# Patient Record
Sex: FEMALE | Race: WHITE | NOT HISPANIC OR LATINO | Employment: OTHER | URBAN - METROPOLITAN AREA
[De-identification: names, ages, dates, MRNs, and addresses within clinical notes are randomized per-mention and may not be internally consistent; named-entity substitution may affect disease eponyms.]

---

## 2018-02-03 ENCOUNTER — APPOINTMENT (OUTPATIENT)
Dept: GENERAL RADIOLOGY | Facility: CLINIC | Age: 68
End: 2018-02-03
Attending: ORTHOPAEDIC SURGERY
Payer: MEDICARE

## 2018-02-03 ENCOUNTER — ANESTHESIA EVENT (OUTPATIENT)
Dept: SURGERY | Facility: CLINIC | Age: 68
End: 2018-02-03
Payer: MEDICARE

## 2018-02-03 ENCOUNTER — ANESTHESIA (OUTPATIENT)
Dept: SURGERY | Facility: CLINIC | Age: 68
End: 2018-02-03
Payer: MEDICARE

## 2018-02-03 ENCOUNTER — APPOINTMENT (OUTPATIENT)
Dept: GENERAL RADIOLOGY | Facility: CLINIC | Age: 68
End: 2018-02-03
Attending: INTERNAL MEDICINE
Payer: MEDICARE

## 2018-02-03 ENCOUNTER — HOSPITAL ENCOUNTER (OUTPATIENT)
Facility: CLINIC | Age: 68
Setting detail: OBSERVATION
Discharge: HOME | End: 2018-02-04
Attending: INTERNAL MEDICINE | Admitting: ORTHOPAEDIC SURGERY
Payer: MEDICARE

## 2018-02-03 DIAGNOSIS — Z78.9 DEEP VEIN THROMBOSIS (DVT) PROPHYLAXIS PRESCRIBED AT DISCHARGE: Primary | ICD-10-CM

## 2018-02-03 DIAGNOSIS — S82.851A CLOSED TRIMALLEOLAR FRACTURE OF RIGHT ANKLE, INITIAL ENCOUNTER: ICD-10-CM

## 2018-02-03 PROBLEM — S82.891A CLOSED RIGHT ANKLE FRACTURE: Status: ACTIVE | Noted: 2018-02-03

## 2018-02-03 LAB
ANION GAP SERPL CALCULATED.3IONS-SCNC: 8 MMOL/L (ref 3–14)
BASOPHILS # BLD AUTO: 0.1 10E9/L (ref 0–0.2)
BASOPHILS NFR BLD AUTO: 0.6 %
BUN SERPL-MCNC: 18 MG/DL (ref 7–30)
CALCIUM SERPL-MCNC: 9.4 MG/DL (ref 8.5–10.1)
CHLORIDE SERPL-SCNC: 105 MMOL/L (ref 94–109)
CO2 SERPL-SCNC: 24 MMOL/L (ref 20–32)
CREAT SERPL-MCNC: 0.96 MG/DL (ref 0.52–1.04)
DIFFERENTIAL METHOD BLD: ABNORMAL
EOSINOPHIL # BLD AUTO: 0.2 10E9/L (ref 0–0.7)
EOSINOPHIL NFR BLD AUTO: 1.7 %
ERYTHROCYTE [DISTWIDTH] IN BLOOD BY AUTOMATED COUNT: 12.9 % (ref 10–15)
GFR SERPL CREATININE-BSD FRML MDRD: 58 ML/MIN/1.7M2
GLUCOSE SERPL-MCNC: 97 MG/DL (ref 70–99)
HCT VFR BLD AUTO: 40 % (ref 35–47)
HGB BLD-MCNC: 12.9 G/DL (ref 11.7–15.7)
IMM GRANULOCYTES # BLD: 0.1 10E9/L (ref 0–0.4)
IMM GRANULOCYTES NFR BLD: 0.4 %
LYMPHOCYTES # BLD AUTO: 2.3 10E9/L (ref 0.8–5.3)
LYMPHOCYTES NFR BLD AUTO: 17.1 %
MCH RBC QN AUTO: 30.7 PG (ref 26.5–33)
MCHC RBC AUTO-ENTMCNC: 32.3 G/DL (ref 31.5–36.5)
MCV RBC AUTO: 95 FL (ref 78–100)
MONOCYTES # BLD AUTO: 0.8 10E9/L (ref 0–1.3)
MONOCYTES NFR BLD AUTO: 5.7 %
NEUTROPHILS # BLD AUTO: 9.8 10E9/L (ref 1.6–8.3)
NEUTROPHILS NFR BLD AUTO: 74.5 %
NRBC # BLD AUTO: 0 10*3/UL
NRBC BLD AUTO-RTO: 0 /100
PLATELET # BLD AUTO: 362 10E9/L (ref 150–450)
POTASSIUM SERPL-SCNC: 4.4 MMOL/L (ref 3.4–5.3)
RBC # BLD AUTO: 4.2 10E12/L (ref 3.8–5.2)
SODIUM SERPL-SCNC: 137 MMOL/L (ref 133–144)
WBC # BLD AUTO: 13.2 10E9/L (ref 4–11)

## 2018-02-03 PROCEDURE — 25000128 H RX IP 250 OP 636: Performed by: NURSE ANESTHETIST, CERTIFIED REGISTERED

## 2018-02-03 PROCEDURE — 73600 X-RAY EXAM OF ANKLE: CPT | Mod: LT

## 2018-02-03 PROCEDURE — 71000012 ZZH RECOVERY PHASE 1 LEVEL 1 FIRST HR: Performed by: ORTHOPAEDIC SURGERY

## 2018-02-03 PROCEDURE — 80048 BASIC METABOLIC PNL TOTAL CA: CPT | Performed by: INTERNAL MEDICINE

## 2018-02-03 PROCEDURE — 25000125 ZZHC RX 250: Performed by: NURSE ANESTHETIST, CERTIFIED REGISTERED

## 2018-02-03 PROCEDURE — 96374 THER/PROPH/DIAG INJ IV PUSH: CPT

## 2018-02-03 PROCEDURE — 99152 MOD SED SAME PHYS/QHP 5/>YRS: CPT

## 2018-02-03 PROCEDURE — 27810 TREATMENT OF ANKLE FRACTURE: CPT | Mod: RT

## 2018-02-03 PROCEDURE — C1769 GUIDE WIRE: HCPCS | Performed by: ORTHOPAEDIC SURGERY

## 2018-02-03 PROCEDURE — 25000128 H RX IP 250 OP 636: Performed by: ANESTHESIOLOGY

## 2018-02-03 PROCEDURE — 40000986 XR ANKLE RT 2 VW: Mod: RT

## 2018-02-03 PROCEDURE — 40000275 ZZH STATISTIC RCP TIME EA 10 MIN

## 2018-02-03 PROCEDURE — 36415 COLL VENOUS BLD VENIPUNCTURE: CPT | Performed by: ORTHOPAEDIC SURGERY

## 2018-02-03 PROCEDURE — 99223 1ST HOSP IP/OBS HIGH 75: CPT | Mod: AI | Performed by: INTERNAL MEDICINE

## 2018-02-03 PROCEDURE — 37000009 ZZH ANESTHESIA TECHNICAL FEE, EACH ADDTL 15 MIN: Performed by: ORTHOPAEDIC SURGERY

## 2018-02-03 PROCEDURE — 85025 COMPLETE CBC W/AUTO DIFF WBC: CPT | Performed by: INTERNAL MEDICINE

## 2018-02-03 PROCEDURE — 27210794 ZZH OR GENERAL SUPPLY STERILE: Performed by: ORTHOPAEDIC SURGERY

## 2018-02-03 PROCEDURE — 25000128 H RX IP 250 OP 636: Performed by: ORTHOPAEDIC SURGERY

## 2018-02-03 PROCEDURE — 12000000 ZZH R&B MED SURG/OB

## 2018-02-03 PROCEDURE — 27210995 ZZH RX 272: Performed by: ORTHOPAEDIC SURGERY

## 2018-02-03 PROCEDURE — 36000063 ZZH SURGERY LEVEL 4 EA 15 ADDTL MIN: Performed by: ORTHOPAEDIC SURGERY

## 2018-02-03 PROCEDURE — 36000065 ZZH SURGERY LEVEL 4 W FLUORO 1ST 30 MIN: Performed by: ORTHOPAEDIC SURGERY

## 2018-02-03 PROCEDURE — 40000277 XR SURGERY CARM FLUORO LESS THAN 5 MIN W STILLS: Mod: TC

## 2018-02-03 PROCEDURE — 25000566 ZZH SEVOFLURANE, EA 15 MIN: Performed by: ORTHOPAEDIC SURGERY

## 2018-02-03 PROCEDURE — 73610 X-RAY EXAM OF ANKLE: CPT | Mod: RT

## 2018-02-03 PROCEDURE — 40000306 ZZH STATISTIC PRE PROC ASSESS II: Performed by: ORTHOPAEDIC SURGERY

## 2018-02-03 PROCEDURE — 25000128 H RX IP 250 OP 636: Performed by: INTERNAL MEDICINE

## 2018-02-03 PROCEDURE — C1713 ANCHOR/SCREW BN/BN,TIS/BN: HCPCS | Performed by: ORTHOPAEDIC SURGERY

## 2018-02-03 PROCEDURE — 99285 EMERGENCY DEPT VISIT HI MDM: CPT | Mod: 25

## 2018-02-03 PROCEDURE — 37000008 ZZH ANESTHESIA TECHNICAL FEE, 1ST 30 MIN: Performed by: ORTHOPAEDIC SURGERY

## 2018-02-03 DEVICE — IMP SCR SYN CORTEX 2.7X14MM SELF TAP SS 202.814: Type: IMPLANTABLE DEVICE | Site: ANKLE | Status: FUNCTIONAL

## 2018-02-03 DEVICE — IMP SCR SYN CAN 4.0X46MM LONG THRD SS 207.746: Type: IMPLANTABLE DEVICE | Site: ANKLE | Status: FUNCTIONAL

## 2018-02-03 DEVICE — IMP SCR SYN CORTEX 3.5X14MM SELF TAP SS 204.814: Type: IMPLANTABLE DEVICE | Site: ANKLE | Status: FUNCTIONAL

## 2018-02-03 DEVICE — IMP PLATE SYN 1/3 TUBULAR 81MM 07H SS 241.371: Type: IMPLANTABLE DEVICE | Site: ANKLE | Status: FUNCTIONAL

## 2018-02-03 DEVICE — IMP SCR SYN CAN 4.0X44MM LONG THRD SS 207.744: Type: IMPLANTABLE DEVICE | Site: ANKLE | Status: FUNCTIONAL

## 2018-02-03 DEVICE — IMP SCR SYN CORTEX 3.5X16MM SELF TAP SS 204.816: Type: IMPLANTABLE DEVICE | Site: ANKLE | Status: FUNCTIONAL

## 2018-02-03 RX ORDER — ACETAMINOPHEN 325 MG/1
650 TABLET ORAL EVERY 4 HOURS PRN
Status: DISCONTINUED | OUTPATIENT
Start: 2018-02-06 | End: 2018-02-04 | Stop reason: HOSPADM

## 2018-02-03 RX ORDER — HYDROMORPHONE HYDROCHLORIDE 1 MG/ML
.3-.5 INJECTION, SOLUTION INTRAMUSCULAR; INTRAVENOUS; SUBCUTANEOUS
Status: DISCONTINUED | OUTPATIENT
Start: 2018-02-03 | End: 2018-02-04 | Stop reason: HOSPADM

## 2018-02-03 RX ORDER — ONDANSETRON 4 MG/1
4 TABLET, ORALLY DISINTEGRATING ORAL EVERY 6 HOURS PRN
Status: DISCONTINUED | OUTPATIENT
Start: 2018-02-03 | End: 2018-02-04 | Stop reason: HOSPADM

## 2018-02-03 RX ORDER — MAGNESIUM HYDROXIDE 1200 MG/15ML
LIQUID ORAL PRN
Status: DISCONTINUED | OUTPATIENT
Start: 2018-02-03 | End: 2018-02-03 | Stop reason: HOSPADM

## 2018-02-03 RX ORDER — CEFAZOLIN SODIUM 2 G/100ML
2 INJECTION, SOLUTION INTRAVENOUS
Status: COMPLETED | OUTPATIENT
Start: 2018-02-03 | End: 2018-02-03

## 2018-02-03 RX ORDER — NALOXONE HYDROCHLORIDE 0.4 MG/ML
.1-.4 INJECTION, SOLUTION INTRAMUSCULAR; INTRAVENOUS; SUBCUTANEOUS
Status: DISCONTINUED | OUTPATIENT
Start: 2018-02-03 | End: 2018-02-04 | Stop reason: HOSPADM

## 2018-02-03 RX ORDER — PROPOFOL 10 MG/ML
200 INJECTION, EMULSION INTRAVENOUS ONCE
Status: DISCONTINUED | OUTPATIENT
Start: 2018-02-03 | End: 2018-02-04 | Stop reason: HOSPADM

## 2018-02-03 RX ORDER — ACETAMINOPHEN 325 MG/1
650 TABLET ORAL EVERY 4 HOURS PRN
Status: DISCONTINUED | OUTPATIENT
Start: 2018-02-03 | End: 2018-02-03

## 2018-02-03 RX ORDER — CEFAZOLIN SODIUM 1 G/3ML
1 INJECTION, POWDER, FOR SOLUTION INTRAMUSCULAR; INTRAVENOUS SEE ADMIN INSTRUCTIONS
Status: DISCONTINUED | OUTPATIENT
Start: 2018-02-03 | End: 2018-02-03 | Stop reason: HOSPADM

## 2018-02-03 RX ORDER — METOPROLOL TARTRATE 1 MG/ML
1-2 INJECTION, SOLUTION INTRAVENOUS EVERY 5 MIN PRN
Status: DISCONTINUED | OUTPATIENT
Start: 2018-02-03 | End: 2018-02-03 | Stop reason: HOSPADM

## 2018-02-03 RX ORDER — FENTANYL CITRATE 50 UG/ML
INJECTION, SOLUTION INTRAMUSCULAR; INTRAVENOUS PRN
Status: DISCONTINUED | OUTPATIENT
Start: 2018-02-03 | End: 2018-02-03

## 2018-02-03 RX ORDER — FENTANYL CITRATE 50 UG/ML
25-50 INJECTION, SOLUTION INTRAMUSCULAR; INTRAVENOUS
Status: DISCONTINUED | OUTPATIENT
Start: 2018-02-03 | End: 2018-02-03 | Stop reason: HOSPADM

## 2018-02-03 RX ORDER — MEPERIDINE HYDROCHLORIDE 25 MG/ML
12.5 INJECTION INTRAMUSCULAR; INTRAVENOUS; SUBCUTANEOUS
Status: DISCONTINUED | OUTPATIENT
Start: 2018-02-03 | End: 2018-02-03 | Stop reason: HOSPADM

## 2018-02-03 RX ORDER — LIDOCAINE 40 MG/G
CREAM TOPICAL
Status: DISCONTINUED | OUTPATIENT
Start: 2018-02-03 | End: 2018-02-03 | Stop reason: HOSPADM

## 2018-02-03 RX ORDER — SODIUM CHLORIDE, SODIUM LACTATE, POTASSIUM CHLORIDE, CALCIUM CHLORIDE 600; 310; 30; 20 MG/100ML; MG/100ML; MG/100ML; MG/100ML
INJECTION, SOLUTION INTRAVENOUS CONTINUOUS
Status: DISCONTINUED | OUTPATIENT
Start: 2018-02-03 | End: 2018-02-03 | Stop reason: HOSPADM

## 2018-02-03 RX ORDER — OXYCODONE HYDROCHLORIDE 5 MG/1
5-10 TABLET ORAL EVERY 4 HOURS PRN
Status: DISCONTINUED | OUTPATIENT
Start: 2018-02-03 | End: 2018-02-04 | Stop reason: HOSPADM

## 2018-02-03 RX ORDER — HYDROMORPHONE HYDROCHLORIDE 1 MG/ML
.3-.5 INJECTION, SOLUTION INTRAMUSCULAR; INTRAVENOUS; SUBCUTANEOUS
Status: DISCONTINUED | OUTPATIENT
Start: 2018-02-03 | End: 2018-02-03

## 2018-02-03 RX ORDER — NALOXONE HYDROCHLORIDE 0.4 MG/ML
.1-.4 INJECTION, SOLUTION INTRAMUSCULAR; INTRAVENOUS; SUBCUTANEOUS
Status: DISCONTINUED | OUTPATIENT
Start: 2018-02-03 | End: 2018-02-03 | Stop reason: HOSPADM

## 2018-02-03 RX ORDER — ACETAMINOPHEN 325 MG/1
975 TABLET ORAL EVERY 8 HOURS
Status: DISCONTINUED | OUTPATIENT
Start: 2018-02-04 | End: 2018-02-04 | Stop reason: HOSPADM

## 2018-02-03 RX ORDER — ALBUTEROL SULFATE 0.83 MG/ML
2.5 SOLUTION RESPIRATORY (INHALATION) EVERY 4 HOURS PRN
Status: DISCONTINUED | OUTPATIENT
Start: 2018-02-03 | End: 2018-02-03 | Stop reason: HOSPADM

## 2018-02-03 RX ORDER — LIDOCAINE 40 MG/G
CREAM TOPICAL
Status: DISCONTINUED | OUTPATIENT
Start: 2018-02-03 | End: 2018-02-04 | Stop reason: HOSPADM

## 2018-02-03 RX ORDER — OXYCODONE HYDROCHLORIDE 5 MG/1
5-10 TABLET ORAL
Status: DISCONTINUED | OUTPATIENT
Start: 2018-02-03 | End: 2018-02-03

## 2018-02-03 RX ORDER — ONDANSETRON 2 MG/ML
4 INJECTION INTRAMUSCULAR; INTRAVENOUS EVERY 30 MIN PRN
Status: DISCONTINUED | OUTPATIENT
Start: 2018-02-03 | End: 2018-02-03 | Stop reason: HOSPADM

## 2018-02-03 RX ORDER — ONDANSETRON 4 MG/1
4 TABLET, ORALLY DISINTEGRATING ORAL EVERY 30 MIN PRN
Status: DISCONTINUED | OUTPATIENT
Start: 2018-02-03 | End: 2018-02-03 | Stop reason: HOSPADM

## 2018-02-03 RX ORDER — SODIUM CHLORIDE 9 MG/ML
INJECTION, SOLUTION INTRAVENOUS CONTINUOUS
Status: DISCONTINUED | OUTPATIENT
Start: 2018-02-04 | End: 2018-02-04 | Stop reason: HOSPADM

## 2018-02-03 RX ORDER — HYDROMORPHONE HYDROCHLORIDE 1 MG/ML
0.5 INJECTION, SOLUTION INTRAMUSCULAR; INTRAVENOUS; SUBCUTANEOUS
Status: DISCONTINUED | OUTPATIENT
Start: 2018-02-03 | End: 2018-02-03

## 2018-02-03 RX ORDER — NALOXONE HYDROCHLORIDE 0.4 MG/ML
.1-.4 INJECTION, SOLUTION INTRAMUSCULAR; INTRAVENOUS; SUBCUTANEOUS
Status: DISCONTINUED | OUTPATIENT
Start: 2018-02-03 | End: 2018-02-03

## 2018-02-03 RX ORDER — PROPOFOL 10 MG/ML
INJECTION, EMULSION INTRAVENOUS CONTINUOUS PRN
Status: DISCONTINUED | OUTPATIENT
Start: 2018-02-03 | End: 2018-02-03

## 2018-02-03 RX ORDER — ONDANSETRON 2 MG/ML
4 INJECTION INTRAMUSCULAR; INTRAVENOUS EVERY 6 HOURS PRN
Status: DISCONTINUED | OUTPATIENT
Start: 2018-02-03 | End: 2018-02-04 | Stop reason: HOSPADM

## 2018-02-03 RX ORDER — HYDROMORPHONE HYDROCHLORIDE 1 MG/ML
.3-.5 INJECTION, SOLUTION INTRAMUSCULAR; INTRAVENOUS; SUBCUTANEOUS EVERY 10 MIN PRN
Status: DISCONTINUED | OUTPATIENT
Start: 2018-02-03 | End: 2018-02-03 | Stop reason: HOSPADM

## 2018-02-03 RX ORDER — GLYCOPYRROLATE 0.2 MG/ML
INJECTION, SOLUTION INTRAMUSCULAR; INTRAVENOUS PRN
Status: DISCONTINUED | OUTPATIENT
Start: 2018-02-03 | End: 2018-02-03

## 2018-02-03 RX ORDER — PROMETHAZINE HYDROCHLORIDE 25 MG/ML
6.25 INJECTION, SOLUTION INTRAMUSCULAR; INTRAVENOUS
Status: DISCONTINUED | OUTPATIENT
Start: 2018-02-03 | End: 2018-02-03 | Stop reason: HOSPADM

## 2018-02-03 RX ORDER — PROPOFOL 10 MG/ML
INJECTION, EMULSION INTRAVENOUS PRN
Status: DISCONTINUED | OUTPATIENT
Start: 2018-02-03 | End: 2018-02-03

## 2018-02-03 RX ORDER — AMOXICILLIN 250 MG
1-2 CAPSULE ORAL 2 TIMES DAILY
Status: DISCONTINUED | OUTPATIENT
Start: 2018-02-04 | End: 2018-02-04 | Stop reason: HOSPADM

## 2018-02-03 RX ORDER — LIDOCAINE HYDROCHLORIDE 10 MG/ML
INJECTION, SOLUTION INFILTRATION; PERINEURAL PRN
Status: DISCONTINUED | OUTPATIENT
Start: 2018-02-03 | End: 2018-02-03

## 2018-02-03 RX ADMIN — FENTANYL CITRATE 50 MCG: 50 INJECTION, SOLUTION INTRAMUSCULAR; INTRAVENOUS at 20:10

## 2018-02-03 RX ADMIN — FENTANYL CITRATE 50 MCG: 50 INJECTION, SOLUTION INTRAMUSCULAR; INTRAVENOUS at 20:12

## 2018-02-03 RX ADMIN — HYDROMORPHONE HYDROCHLORIDE 0.4 MG: 1 INJECTION, SOLUTION INTRAMUSCULAR; INTRAVENOUS; SUBCUTANEOUS at 19:08

## 2018-02-03 RX ADMIN — HYDROMORPHONE HYDROCHLORIDE 0.4 MG: 1 INJECTION, SOLUTION INTRAMUSCULAR; INTRAVENOUS; SUBCUTANEOUS at 20:50

## 2018-02-03 RX ADMIN — Medication 0.5 MG: at 12:47

## 2018-02-03 RX ADMIN — SODIUM CHLORIDE, POTASSIUM CHLORIDE, SODIUM LACTATE AND CALCIUM CHLORIDE: 600; 310; 30; 20 INJECTION, SOLUTION INTRAVENOUS at 20:25

## 2018-02-03 RX ADMIN — PHENYLEPHRINE HYDROCHLORIDE 150 MCG: 10 INJECTION, SOLUTION INTRAMUSCULAR; INTRAVENOUS; SUBCUTANEOUS at 19:06

## 2018-02-03 RX ADMIN — PHENYLEPHRINE HYDROCHLORIDE 150 MCG: 10 INJECTION, SOLUTION INTRAMUSCULAR; INTRAVENOUS; SUBCUTANEOUS at 18:54

## 2018-02-03 RX ADMIN — MIDAZOLAM 2 MG: 1 INJECTION INTRAMUSCULAR; INTRAVENOUS at 18:42

## 2018-02-03 RX ADMIN — PROPOFOL 150 MG: 10 INJECTION, EMULSION INTRAVENOUS at 18:47

## 2018-02-03 RX ADMIN — PHENYLEPHRINE HYDROCHLORIDE 150 MCG: 10 INJECTION, SOLUTION INTRAMUSCULAR; INTRAVENOUS; SUBCUTANEOUS at 19:01

## 2018-02-03 RX ADMIN — HYDROMORPHONE HYDROCHLORIDE 0.6 MG: 1 INJECTION, SOLUTION INTRAMUSCULAR; INTRAVENOUS; SUBCUTANEOUS at 19:12

## 2018-02-03 RX ADMIN — CEFAZOLIN SODIUM 2 G: 2 INJECTION, SOLUTION INTRAVENOUS at 18:42

## 2018-02-03 RX ADMIN — FENTANYL CITRATE 100 MCG: 50 INJECTION, SOLUTION INTRAMUSCULAR; INTRAVENOUS at 18:47

## 2018-02-03 RX ADMIN — GLYCOPYRROLATE 0.2 MG: 0.2 INJECTION, SOLUTION INTRAMUSCULAR; INTRAVENOUS at 18:47

## 2018-02-03 RX ADMIN — LIDOCAINE HYDROCHLORIDE 30 MG: 10 INJECTION, SOLUTION INFILTRATION; PERINEURAL at 18:47

## 2018-02-03 RX ADMIN — VASOPRESSIN 2 UNITS: 20 INJECTION INTRAMUSCULAR; SUBCUTANEOUS at 19:08

## 2018-02-03 RX ADMIN — PROPOFOL 75 MCG/KG/MIN: 10 INJECTION, EMULSION INTRAVENOUS at 18:51

## 2018-02-03 RX ADMIN — SODIUM CHLORIDE, POTASSIUM CHLORIDE, SODIUM LACTATE AND CALCIUM CHLORIDE: 600; 310; 30; 20 INJECTION, SOLUTION INTRAVENOUS at 18:42

## 2018-02-03 ASSESSMENT — ENCOUNTER SYMPTOMS
JOINT SWELLING: 1
ARTHRALGIAS: 1

## 2018-02-03 NOTE — IP AVS SNAPSHOT
MRN:[id number]                      After Visit Summary   2/3/2018    Disha Alvarez    MRN: [id number]           Thank you!     Thank you for choosing [facility] for your care. Our goal is always to provide you with excellent care. Hearing back from our patients is one way we can continue to improve our services. Please take a few minutes to complete the written survey that you may receive in the mail after you visit. If you would like to speak to someone directly about your visit please contact Patient Relations at [phone]. Thank you!          Patient Information     Date Of Birth          1950        About your hospital stay     You were admitted on:  February 3, 2018 You last received care in the:  [facility]    You were discharged on:  February 4, 2018       Who to Call     For medical emergencies, please call 911.  For non-urgent questions about your medical care, please call your primary care provider or clinic, None  For questions related to your surgery, please call your surgery clinic        Attending Provider     Provider Specialty    Leann Jenkins MD Emergency Medicine    CricketYou abrams MD Internal Medicine    [facility] - [facility]       Primary Care Provider Fax #    Physician No Ref-Primary [phone]      After Care Instructions     Activity       Your activity upon discharge: activity as instructed with crutches            Diet       Follow this diet upon discharge: Orders Placed This Encounter      Advance Diet as Tolerated: Regular Diet Adult                  Follow-up Appointments     Follow-up and recommended labs and tests        Follow up with primary care provider, Physician No Ref-Primary, within 7 days to evaluate after surgery.  The following labs/tests are recommended: cbc, vmp.    Follow up with orthopedic, as scheduled in 2 weeks. to evaluate after surgery.  No follow up labs  or test are needed.                  Further instructions from your care team       OK to take tylenol over the counter, and use oxycodone only for breakthrough pain as needed.        No weight bearing on your surgical foot until told otherwise by your surgeon.    Care of your cast:  This information was prepared for you to help you take of your cast. Please read each section carefully and ask your physician if you have any questions.    What to Check For:  1. Check your toes for color changes, swelling, loss of motion, numbness, tingling or severe pain. In infants or young children, check for crying which cannot be soothed by usual methods. Call your physician if these symptoms occur.  2. If swelling is noted during the first 24 to 48 hours, elevate the extremity higher than your  head. After this time, elevate it whenever you are in bed.  3. Check cast for undesirable odors or drainage. Also check for any areas of local pain under your cast. These may be signs of an area of pressure under the cast.  4. Be sure any padding used is well anchored to the cast. Be careful not to pull padding out. Without the padding, loose material may slip into cast and cause irritation.    How to Care for Your Cast:  1. Avoid bumping or knocking the cast against any hard object. Cover rough areas with tape.  2. Never trim or cut down the length of your cast. Please call your physician if the cast becomes loose, broken or cracked.  3. If skin under the cast begins to itch, do not use anything to scratch under the cast since it may break the skin and cause an infection. Parents should be alert to prevent children from sticking forks, sticks or other objects inside the cast.  4. You may wash areas around the cast, but do not saturate the cast in the process.   5. Please keep cast clean and dry. Cover with plastic bag for showers.       While on narcotic pain medication, to avoid constipation:  1. Drink plenty of water to keep well  "hydrated  2. May take over the counter Colace or Senna (use as directed on label)    Call your physician (   ) if you experience/notice any of the followin. Fever greater than 100 degrees with body chills or excessive sweating.  2. Bleeding or large amounts of drainage at incision site or on the dressing/cast.   3. A bluish color, increased swelling, loss of motion, increased numbness/tingling or severe pain of toes.  2. Unusually foul odor from toes.  3. Unusual drainage (blood is expected if there has been surgery).  4. Broken, cracked or very loose cast.  5. Localized pain under cast not controlled with oral pain medications, ice and rest.   6. No bowel movement in 3 days (may use Milk of Magnesia or other over the counter remedy).  7. Chest pain, shortness of breath, and/or calf pain with excessive swelling.  8. Generalized feeling of illness.  9. Any other questions or concerns related to your surgery/recovery.      Thank you for allowing [facility] to participate in your cares!!         Pending Results     No orders found for last 3 day(s).            Statement of Approval     Ordered          18 1133  I have reviewed and agree with all the recommendations and orders detailed in this document.  EFFECTIVE NOW     Approved and electronically signed by:  Christiano Carbajal MD             Admission Information     Date & Time Provider Department Dept. Phone    2/3/2018 Christiano Carbajal MD [facility] [phone]      Your Vitals Were     Blood Pressure Pulse Temperature Respirations Height Weight    151/73 (BP Location: Left arm) 88 97.9  F (36.6  C) (Oral) 18 1.676 m (5' 6\") 97.1 kg (214 lb)    Pulse Oximetry BMI (Body Mass Index)                96% 34.54 kg/m2          MyChart Information     [employer] lets you send messages to your doctor, view your test results, renew your prescriptions, schedule appointments and more. To sign up, go to [email or web address] " "Click on \"Log in\" on the left side of the screen, which will take you to the Welcome page. Then click on \"Sign up Now\" on the right side of the page.     You will be asked to enter the access code listed below, as well as some personal information. Please follow the directions to create your username and password.     Your access code is: [email or web address]  Expires: 2018  2:25 PM     Your access code will  in 90 days. If you need help or a new code, please call your [place] clinic or [phone].        Care EveryWhere ID     This is your Care EveryWhere ID. This could be used by other organizations to access your [place] medical records  [id number]        Equal Access to Services     ZULEIKA ALAN : Marino Evans, wasylvia faria, qaybquoc kaalnikhil keller, elena ibarra . So [facility] [phone].    ATENCIÓN: Si habla español, tiene a azul disposición servicios gratuitos de asistencia lingüística. Llame al [phone].    We comply with applicable federal civil rights laws and [place] laws. We do not discriminate on the basis of race, color, national origin, age, disability, sex, sexual orientation, or gender identity.               Review of your medicines      START taking        Dose / Directions    order for DME   Used for:  Closed trimalleolar fracture of right ankle, initial encounter        Equipment being ordered: Walker Wheels () and Walker () Treatment Diagnosis: Impaired gait   Quantity:  1 each   Refills:  0       oxyCODONE IR 5 MG tablet   Commonly known as:  ROXICODONE   Used for:  Closed trimalleolar fracture of right ankle, initial encounter   Notes to Patient:  Monitor for sedation and constipation.  Take only as prescribed        Dose:  5-10 mg   Take 1-2 tablets (5-10 mg) by mouth every 4 hours as needed for moderate to severe pain   Quantity:  20 tablet   Refills:  0         CONTINUE these medicines which may have CHANGED, or have new " prescriptions. If we are uncertain of the size of tablets/capsules you have at home, strength may be listed as something that might have changed.        Dose / Directions    aspirin  MG EC tablet   This may have changed:    - medication strength  - how much to take   Used for:  Deep vein thrombosis (DVT) prophylaxis prescribed at discharge   Notes to Patient:  Start 2/5/18 AM,  Used for preventing blood clots post op        Dose:  325 mg   Take 1 tablet (325 mg) by mouth daily   Quantity:  60 tablet   Refills:  0         CONTINUE these medicines which have NOT CHANGED        Dose / Directions    LISINOPRIL PO        Dose:  40 mg   Take 40 mg by mouth daily   Refills:  0       SIMVASTATIN PO   Notes to Patient:  Continue as you have been instructed to at home        Dose:  20 mg   Take 20 mg by mouth daily   Refills:  0       VITAMIN D-3 PO   Notes to Patient:  Continue as you have been instructed to at home.         Dose:  2000 Units   Take 2,000 Units by mouth daily   Refills:  0            Where to get your medicines      These medications were sent to [id number] - [place], [place] - [address] [address]  [address] [address] [address]     Phone:  [phone]     aspirin  MG EC tablet         Some of these will need a paper prescription and others can be bought over the counter. Ask your nurse if you have questions.     Bring a paper prescription for each of these medications     order for DME    oxyCODONE IR 5 MG tablet                Protect others around you: Learn how to safely use, store and throw away your medicines at www.disposemymeds.org.        Information about OPIOIDS     PRESCRIPTION OPIOIDS: WHAT YOU NEED TO KNOW    Prescription opioids can be used to help relieve moderate to severe pain and are often prescribed following a surgery or injury, or for certain health conditions. These medications can be an important part of treatment but also come with serious risks. It is  important to work with your health care provider to make sure you are getting the safest, most effective care.    WHAT ARE THE RISKS AND SIDE EFFECTS OF OPIOID USE?  Prescription opioids carry serious risks of addiction and overdose, especially with prolonged use. An opioid overdose, often marked by slowed breathing can cause sudden death. The use of prescription opioids can have a number of side effects as well, even when taken as directed:      Tolerance - meaning you might need to take more of a medication for the same pain relief    Physical dependence - meaning you have symptoms of withdrawal when a medication is stopped    Increased sensitivity to pain    Constipation    Nausea, vomiting, and dry mouth    Sleepiness and dizziness    Confusion    Depression    Low levels of testosterone that can result in lower sex drive, energy, and strength    Itching and sweating    RISKS ARE GREATER WITH:    History of drug misuse, substance use disorder, or overdose    Mental health conditions (such as depression or anxiety)    Sleep apnea    Older age (65 years or older)    Pregnancy    Avoid alcohol while taking prescription opioids.   Also, unless specifically advised by your health care provider, medications to avoid include:    Benzodiazepines (such as Xanax or Valium)    Muscle relaxants (such as Soma or Flexeril)    Hypnotics (such as Ambien or Lunesta)    Other prescription opioids    KNOW YOUR OPTIONS:  Talk to your health care provider about ways to manage your pain that do not involve prescription opioids. Some of these options may actually work better and have fewer risks and side effects:    Pain relievers such as acetaminophen, ibuprofen, and naproxen    Some medications that are also used for depression or seizures    Physical therapy and exercise    Cognitive behavioral therapy, a psychological, goal-directed approach, in which patients learn how to modify physical, behavioral, and emotional triggers of  pain and stress    IF YOU ARE PRESCRIBED OPIOIDS FOR PAIN:    Never take opioids in greater amounts or more often than prescribed    Follow up with your primary health care provider and work together to create a plan on how to manage your pain.    Talk about ways to help manage your pain that do not involve prescription opioids    Talk about all concerns and side effects    Help prevent misuse and abuse    Never sell or share prescription opioids    Never use another person's prescription opioids    Store prescription opioids in a secure place and out of reach of others (this may include visitors, children, friends, and family)    Visit www.cdc.gov/drugoverdose to learn about risks of opioid abuse and overdose    If you believe you may be struggling with addiction, tell your health care provider and ask for guidance or call [facility]'s National Helpline at 6-034-976-HELP    LEARN MORE / www.cdc.gov/drugoverdose/prescribing/guideline.html    Safely dispose of unused prescription opioids: Find your local drug take-back programs and more information about the importance of safe disposal at www.doseofreality.[place].gov             Medication List: This is a list of all your medications and when to take them. Check marks below indicate your daily home schedule. Keep this list as a reference.      Medications           Morning Afternoon Evening Bedtime As Needed    aspirin  MG EC tablet   Take 1 tablet (325 mg) by mouth daily   Notes to Patient:  Start 2/5/18 AM,  Used for preventing blood clots post op                                   LISINOPRIL PO   Take 40 mg by mouth daily   Last time this was given:  40 mg on 2/4/2018  9:31 AM   Next Dose Due:  2/5/18 AM                                   order for DME   Equipment being ordered: Walker Wheels () and Walker () Treatment Diagnosis: Impaired gait                                oxyCODONE IR 5 MG tablet   Commonly known as:  ROXICODONE   Take 1-2 tablets (5-10  mg) by mouth every 4 hours as needed for moderate to severe pain   Last time this was given:  5 mg on 2/4/2018  6:18 AM   Notes to Patient:  Monitor for sedation and constipation.  Take only as prescribed                                   SIMVASTATIN PO   Take 20 mg by mouth daily   Notes to Patient:  Continue as you have been instructed to at home                                VITAMIN D-3 PO   Take 2,000 Units by mouth daily   Notes to Patient:  Continue as you have been instructed to at home.                                           More Information        Understanding Ankle Fracture Open Reduction and Internal Fixation  Open reduction and internal fixation (ORIF) puts the pieces of a broken bone back together so they can heal. Open reduction means the bones are put back in place during a surgery. Internal fixation means that special hardware is used to hold the bone pieces together. This helps the bone heal correctly. The procedure is done by an orthopedic surgeon. This is a doctor with special training in treating bone, joint, and muscle problems.  How does an ankle fracture happen?  Three bones make up the ankle joint. These are the shinbone (tibia), the smaller bone in your leg (fibula), and a bone in your foot (talus).  Different kinds of injury can damage the lower tibia, lower fibula, or talus. In some cases, only one of these bones might break. Or you may have a break in two or more of these bones. The bones may break, but the pieces are still lined up correctly. Or they may be broken and not lined up correctly.  Why is an ankle fracture ORIF done?  You are more likely to need ORIF if:    The bones of your leg are out of alignment    One or more bones broke through the skin    Your bones broke into several pieces    Your ankle is unstable  How is an ankle fracture ORIF done?  During an open reduction, the bone pieces are put back in their proper alignment. The bones are then connected back in place with  hardware. This is called internal fixation. The hardware may include screws, plates, rods, wires, or nails.  What are the risks of ankle fracture ORIF?  All surgery has risks. The risks of ankle fracture ORIF include:    Infection    Bleeding    Nerve damage    Skin complications    Misaligned bone    Blood clots    Fat embolism    Irritation of area from the hardware    Problems from anesthesia    Need for additional surgery  Your risks vary based on your age and general health. For example, if you are a smoker or if you have low bone density, you may have a higher risk for certain problems. People with poorly controlled diabetes may also have a higher risk for problems. Talk with your healthcare provider about which risks apply most to you.  Date Last Reviewed: 4/1/2017 2000-2017 [employer]. [address] [place] [address], [place] [address]. All rights reserved. This information is not intended as a substitute for professional medical care. Always follow your healthcare professional's instructions.                Patient Education    Oxycodone Hydrochloride Oral capsule    Oxycodone Hydrochloride Oral solution    Oxycodone Hydrochloride Oral tablet    Oxycodone Hydrochloride Oral tablet [Abuse Deterrent]    Oxycodone Hydrochloride Oral tablet, extended-release  Oxycodone Hydrochloride Oral tablet  What is this medicine?  OXYCODONE (ox i KOE done) is a pain reliever. It is used to treat moderate to severe pain.  This medicine may be used for other purposes; ask your health care provider or pharmacist if you have questions.  What should I tell my health care provider before I take this medicine?  They need to know if you have any of these conditions:    Hernandez's disease    brain tumor    drug abuse or addiction    head injury    heart disease    if you frequently drink alcohol containing drinks    kidney disease or problems going to the bathroom    liver disease    lung disease, asthma, or breathing  problems    mental problems    an unusual or allergic reaction to oxycodone, codeine, hydrocodone, morphine, other medicines, foods, dyes, or preservatives    pregnant or trying to get pregnant    breast-feeding  How should I use this medicine?  Take this medicine by mouth with a glass of water. Follow the directions on the prescription label. You can take it with or without food. If it upsets your stomach, take it with food. Take your medicine at regular intervals. Do not take it more often than directed. Do not stop taking except on your doctor's advice.  Some brands of this medicine, like Oxecta, have special instructions. Ask your doctor or pharmacist if these directions are for you: Do not cut, crush or chew this medicine. Swallow only one tablet at a time. Do not wet, soak, or lick the tablet before you take it.  Talk to your pediatrician regarding the use of this medicine in children. Special care may be needed.  Overdosage: If you think you have taken too much of this medicine contact a poison control center or emergency room at once.  NOTE: This medicine is only for you. Do not share this medicine with others.  What if I miss a dose?  If you miss a dose, take it as soon as you can. If it is almost time for your next dose, take only that dose. Do not take double or extra doses.  What may interact with this medicine?    alcohol    antihistamines    certain medicines used for nausea like chlorpromazine, droperidol    erythromycin    ketoconazole    medicines for depression, anxiety, or psychotic disturbances    medicines for sleep    muscle relaxants    naloxone    naltrexone    narcotic medicines (opiates) for pain    nilotinib    phenobarbital    phenytoin    rifampin    ritonavir    voriconazole  This list may not describe all possible interactions. Give your health care provider a list of all the medicines, herbs, non-prescription drugs, or dietary supplements you use. Also tell them if you smoke, drink  alcohol, or use illegal drugs. Some items may interact with your medicine.  What should I watch for while using this medicine?  Tell your doctor or health care professional if your pain does not go away, if it gets worse, or if you have new or a different type of pain. You may develop tolerance to the medicine. Tolerance means that you will need a higher dose of the medicine for pain relief. Tolerance is normal and is expected if you take this medicine for a long time.  Do not suddenly stop taking your medicine because you may develop a severe reaction. Your body becomes used to the medicine. This does NOT mean you are addicted. Addiction is a behavior related to getting and using a drug for a non-medical reason. If you have pain, you have a medical reason to take pain medicine. Your doctor will tell you how much medicine to take. If your doctor wants you to stop the medicine, the dose will be slowly lowered over time to avoid any side effects.  You may get drowsy or dizzy when you first start taking this medicine or change doses. Do not drive, use machinery, or do anything that may be dangerous until you know how the medicine affects you. Stand or sit up slowly.  There are different types of narcotic medicines (opiates) for pain. If you take more than one type at the same time, you may have more side effects. Give your health care provider a list of all medicines you use. Your doctor will tell you how much medicine to take. Do not take more medicine than directed. Call emergency for help if you have problems breathing.  This medicine will cause constipation. Try to have a bowel movement at least every 2 to 3 days. If you do not have a bowel movement for 3 days, call your doctor or health care professional.  Your mouth may get dry. Drinking water, chewing sugarless gum, or sucking on hard candy may help. See your dentist every 6 months.  What side effects may I notice from receiving this medicine?  Side effects that  you should report to your doctor or health care professional as soon as possible:    allergic reactions like skin rash, itching or hives, swelling of the face, lips, or tongue    breathing problems    confusion    feeling faint or lightheaded, falls    trouble passing urine or change in the amount of urine    unusually weak or tired  Side effects that usually do not require medical attention (report to your doctor or health care professional if they continue or are bothersome):    constipation    dry mouth    itching    nausea, vomiting    upset stomach  This list may not describe all possible side effects. Call your doctor for medical advice about side effects. You may report side effects to FDA at 1-523-FDA-7482.  Where should I keep my medicine?  Keep out of the reach of children. This medicine can be abused. Keep your medicine in a safe place to protect it from theft. Do not share this medicine with anyone. Selling or giving away this medicine is dangerous and against the law.  Store at room temperature between 15 and 30 degrees C (59 and 86 degrees F). Protect from light. Keep container tightly closed.  This medicine may cause accidental overdose and death if it is taken by other adults, children, or pets. Flush any unused medicine down the toilet to reduce the chance of harm. Do not use the medicine after the expiration date.  NOTE: This sheet is a summary. It may not cover all possible information. If you have questions about this medicine, talk to your doctor, pharmacist, or health care provider.  NOTE:This sheet is a summary. It may not cover all possible information. If you have questions about this medicine, talk to your doctor, pharmacist, or health care provider. Copyright  2016 [employer]

## 2018-02-03 NOTE — ED PROVIDER NOTES
History     Chief Complaint:  Ankle Pain    HPI   Disha Alvarez is a 67 year old female who presents to the emergency department today for evaluation of right ankle injury after slipping on the ice. She states that she slipped just outside of her town home building on some ice snow. The patient denies any other injuries besides the right ankle, no LOC as well. She was not able to bear weight on the ankle after the injury and had immediate pain. She denies any illnesses of late. She last at toast and coffee around 07:00 this morning.     Allergies:  No Known Drug Allergies      Medications:    Lisinopril   Aspirin   Zocor    Past Medical History:    Hypertension   Hyperlipidemia     Past Surgical History:    Hysterectomy     Family History:    History reviewed. No pertinent family history.      Social History:  The patient was accompanied to the ED by her .  Marital Status:       Review of Systems   Musculoskeletal: Positive for arthralgias (right ankle) and joint swelling (right ankle).   Neurological: Negative for syncope.   All other systems reviewed and are negative.    Physical Exam   First Vitals:  BP: 159/72  Pulse: 84  Temp: 97.8  F (36.6  C)  Resp: 20  SpO2: 98 %    Physical Exam   Constitutional: She is cooperative.   HENT:   Right Ear: Tympanic membrane normal.   Left Ear: Tympanic membrane normal.   Mouth/Throat: Oropharynx is clear and moist and mucous membranes are normal.   Eyes: Conjunctivae are normal.   Neck: Normal range of motion.   Cardiovascular: Regular rhythm and normal heart sounds.    Pulmonary/Chest: Effort normal and breath sounds normal.   Abdominal: Soft. Normal appearance and bowel sounds are normal. There is no rebound and no guarding.   Musculoskeletal:        Right ankle: She exhibits deformity. She exhibits normal pulse. Tenderness.   Normal sensation right foot  Able to wiggle toes   Lymphadenopathy:     She has no cervical adenopathy.   Neurological: She is alert.    Skin: Skin is warm and dry.   Psychiatric: She has a normal mood and affect.       Emergency Department Course     Imaging:  Radiology findings were communicated with the Patient and  who voiced understanding of the findings.  XR Ankle Right 2 Views  Improved alignment of trimalleolar right ankle  fracture/dislocation. The talus remains approximately 1.5 cm laterally  displaced with respect to the tibia.  OMAR PEDRAZA MD    XR Ankle Port Left 2 Views  Comminuted and displaced trimalleolar right ankle  fracture/dislocation. The fibular fracture is approximately 5 cm  proximal to the tibiotalar joint. The talus is laterally displaced  with respect to the tibia. The posterior malleolar and medial  malleolar fracture fragments maintain their position with respect to  the talus.  OMAR PEDRAZA MD    Laboratory:  Laboratory findings were communicated with the Patient and family who voiced understanding of the findings.  CBC: WBC 13.2 (H), HGB 12.9,   BMP: GFR 58 (L), o/w WNL (Creatinine 0.96)    Procedures:      Sedation:      PERFORMED BY: Dr. Jenkins    Pre-Procedure Assessment done at 1230.    EXPECTED LEVEL:  Deep Sedation    INDICATION:  Sedation is required to allow for fracture reduction    Consent obtained from patient after discussing the risks, benefits and alternatives.    PO INTAKE: Appropriately NPO for procedure    ASA CLASS: Class 1 - HEALTHY PATIENT    MALLAMPATI: Grade 1:  Soft palate, uvula, tonsillar pillars, and posterior pharyngeal wall visible    LUNGS: Lungs Clear with good breath sounds bilaterally.     HEART: Normal heart sounds and rate    History and physical reviewed and no updates needed. I have reviewed the lab findings, diagnostic data, medications, and the plan for sedation. I have determined this patient to be an appropriate candidate for the planned sedation and procedure and have reassessed the patient IMMEDIATELY PRIOR to sedation and procedure.    Sedation Post  Procedure Summary:    Prior to the start of the procedure and with procedural staff participation, I verbally confirmed the patient s identity using two indicators, relevant allergies, that the procedure was appropriate and matched the consent or emergent situation, and that the correct equipment/implants were available. Immediately prior to starting the procedure I conducted the Time Out with the procedural staff and re-confirmed the patient s name, procedure, and site/side. (The Joint Commission universal protocol was followed.)  Yes      SEDATIVES: Propofol 100 mg Total    Vital signs, airway, End Tidal CO2 and pulse oximetry were monitored and remained stable throughout the procedure and sedation was maintained until the procedure was complete.  The patient was monitored by staff until sedation discharge criteria were met.    PATIENT TOLERANCE: Patient tolerated the procedure well with no immediate complications.    TIME OF SEDATION IN MINUTES:  10 minutes from beginning to end of physician one to one monitoring.       Procedure: Fracture Reduction     Indication:  Displaced right ankle fracture.    Consent: Informed written, see paper chart.    Procedure Details:  Procedural anesthesia was utilized, as per the above note.  The patient's right lower extremity was grasped above and below the fracture.  Recreating mechanism of injury, the fracture area was reduced successfully.  The neurovascular exam was normal before and after reduction attempt.  The patient tolerated the procedure well, there were no complications.      Splint Placement    PLACEMENT: Custom Orthoglass short leg splint was applied to the right lower extremity and after placement I checked and adjusted the fit to ensure proper positioning. The patient was more comfortable with the splint in place. Sensation and circulation are intact after splint placement.      Interventions:  1247 Dilaudid 0.5 mg IV   Propofol 100 mg (procedure)     Emergency  Department Course:  Nursing notes and vitals reviewed.  I entered the room.  I performed an exam of the patient as documented above.   IV was inserted and blood was drawn for laboratory testing, results above.  The patient was sent for X-rays while in the emergency department, results above.   1245 the patient was rechecked and updated the Patient and Family regarding the results of the laboratory and imaging studies.    The patient received the above intervention(s).   The above fracture reduction procedure was performed and splint was applied.   1423 I spoke with Dr. Mitchell of the orthopedic service regarding patient's presentation, findings, and plan of care.   1428 I updated the patient.   1444 I spoke with Dr. Harley of the hospitalist service regarding patient's presentation, findings, and plan of care.   I discussed the treatment plan with the patient. They expressed understanding of this plan and consented to admission. I discussed the patient with Dr. Harley, who will admit the patient to a monitored bed for further evaluation and treatment.     Impression & Plan      Medical Decision Making:  Disha Alvarez is a 67 year old female who presents to the emergency department today for evaluation of an obvious fracture and dislocation of the ankle. This is reduced though still moderately displaced. I spoke with Dr. Mitchell of orthopedics. We will admit the patient to a medical bed in anticipation of surgical repair tomorrow. Dr. Harley has kindly accepted the patient to his service.     Diagnosis:    ICD-10-CM    1. Closed trimalleolar fracture of right ankle, initial encounter S82.851A Basic metabolic panel     Disposition:   The patient was admitted to the hospital for further evaluation and care.    Scribe Disclosure:  I, Wes Chew, am serving as a scribe on 2/3/2018 to document services personally performed by Leann Jenkins MD, based on my observations and the provider's statements to  me.    [facility] EMERGENCY DEPARTMENT     Leann Jenkins MD  02/03/18 5755

## 2018-02-03 NOTE — ED NOTES
[facility]  ED Nurse Handoff Report    Disha Alvarez is a 67 year old female   ED Chief complaint: Ankle Pain  . ED Diagnosis:   Final diagnoses:   Closed trimalleolar fracture of right ankle, initial encounter     Allergies: No Known Allergies    Code Status: Full Code  Activity level - Baseline/Home:  Independent. Activity Level - Current:   Stand with Assist of 2. Lift room needed: Yes. Bariatric: No   Needed: No   Isolation: No. Infection: Not Applicable.     Vital Signs:   Vitals:    02/03/18 1145 02/03/18 1300 02/03/18 1315 02/03/18 1440   BP: 165/82 130/66 138/71 130/68   Pulse:   82 79   Resp:       Temp:       TempSrc:       SpO2: 97% 99% 98% 96%       Cardiac Rhythm:  ,      Pain level: 0-10 Pain Scale: 4  Patient confused: Yes. Patient Falls Risk: Yes.   Elimination Status: has not voided in ER  Patient Report - Initial Complaint: fall, right ankle fracture. Focused Assessment: deformity of right ankle, now in splint   Tests Performed: x-ray. Abnormal Results: .  Lab Results   Component Value Date    WBC 13.2 02/03/2018     Lab Results   Component Value Date    RBC 4.20 02/03/2018     Lab Results   Component Value Date    HGB 12.9 02/03/2018     Lab Results   Component Value Date    HCT 40.0 02/03/2018     No components found for: MCT  Lab Results   Component Value Date    MCV 95 02/03/2018     Lab Results   Component Value Date    MCH 30.7 02/03/2018     Lab Results   Component Value Date    MCHC 32.3 02/03/2018     Lab Results   Component Value Date    RDW 12.9 02/03/2018     Lab Results   Component Value Date     02/03/2018       Last Basic Metabolic Panel:  Lab Results   Component Value Date     02/03/2018      Lab Results   Component Value Date    POTASSIUM 4.4 02/03/2018     Lab Results   Component Value Date    CHLORIDE 105 02/03/2018     Lab Results   Component Value Date    BASILIO 9.4 02/03/2018     Lab Results   Component Value Date    CO2 24 02/03/2018     Lab  Results   Component Value Date    BUN 18 02/03/2018     Lab Results   Component Value Date    CR 0.96 02/03/2018     Lab Results   Component Value Date    GLC 97 02/03/2018         Treatments provided: reduction with propofol  Family Comments: family here  OBS brochure/video discussed/provided to patient:  N/A  ED Medications:   Medications   HYDROmorphone (PF) (DILAUDID) injection 0.5 mg (0.5 mg Intravenous Given 2/3/18 0307)   propofol (DIPRIVAN) injection 10 mg/mL vial (not administered)     Drips infusing:  No  For the majority of the shift, the patient's behavior Green.      Severe Sepsis OR Septic Shock Diagnosis Present: No      ED Nurse Name/Phone Number: Keisha Downs,   2:56 PM    RECEIVING UNIT ED HANDOFF REVIEW    Above ED Nurse Handoff Report was reviewed: Yes  Reviewed by: Ashley Diggs on February 3, 2018 at 3:07 PM

## 2018-02-03 NOTE — IP AVS SNAPSHOT
[facility]    [address]    [facility] [address]    Phone:  [phone]    Fax:  [phone]                                       After Visit Summary   2/3/2018    Disha Alvarez    MRN: [id number]           After Visit Summary Signature Page     I have received my discharge instructions, and my questions have been answered. I have discussed any challenges I see with this plan with the nurse or doctor.    ..........................................................................................................................................  Patient/Patient Representative Signature      ..........................................................................................................................................  Patient Representative Print Name and Relationship to Patient    ..................................................               ................................................  Date                                            Time    ..........................................................................................................................................  Reviewed by Signature/Title    ...................................................              ..............................................  Date                                                            Time

## 2018-02-03 NOTE — ED NOTES
Arrives with right ankle injury after falling while shoveling snow this morning. She arrives alert and oriented, obvious deformity to right ankle and unable to bear weight, denies other pain or injuries, ABCs intact.

## 2018-02-03 NOTE — PROGRESS NOTES
RT: Writer at bedside to assists MD with conscious sedation. Vitals monitored including heart rate, respiratory rate, SPO2 and ETCO2, BVM at the bedside along with suction setup and nasal/ oral airways available. Patient tolerated procedure well and was awake and alert at the end of the procedure.     Malini Stoner RRT  2/3/2018

## 2018-02-03 NOTE — CONSULTS
Consult Date:  02/03/2018      CHIEF COMPLAINT:  Right ankle pain.      HISTORY OF PRESENT ILLNESS:  Mrs. Alvarez is a 67-year-old woman who suffered a fall earlier today.  She was walking outside and slipped and fell with immediate pain in her right ankle.  She did not hit her head and believes she also landed on her left knee.  She does not have any other pains.  Prior to this fall, she had been feeling well.  She was unable to bear weight on her right ankle and crawled into the garage and pounded on the door until her  came and helped her.  They helped her get into the car and brought her to the Emergency Department.      PAST MEDICAL HISTORY:   1.  Hypertension.   2.  Dyslipidemia.   3.  Hysterectomy.   4.  Vitamin D deficiency.   5.  Cholecystectomy.   6.  Rectocele repair.   7.  Several breast biopsies, which and otherwise negative.      MEDICATIONS:  Not yet been reconciled, but previously included a statin and lisinopril.      ALLERGIES:  NO KNOWN DRUG ALLERGIES.      FAMILY HISTORY:  Reviewed and noncontributory.      SOCIAL HISTORY:  She is .  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  She worked for [employer].  She has never smoked.      REVIEW OF SYSTEMS:  A 10-point review of systems was performed the pertinent positives can be seen in the history of present illness.      I discussed the case at length with the ER physician, I reviewed previous medical records in [facility] and I also reviewed the patient's ankle x-ray.      PHYSICAL EXAMINATION:   VITAL SIGNS:  Blood pressure is 130/68, pulse 79, temperature 97.8, oxygen saturation 96% on room air.   GENERAL:  She is alert, pleasant and cooperative, in no apparent distress.   HEENT:  Pupils round and equal.  Conjunctiva, sclerae and lids are within normal limits.  Oropharynx is pink and moist.  Teeth and lips are within normal limits.   NECK:  Supple, with no masses, no thyromegaly.     HEART:  Regular rhythm, no murmurs.    LUNGS:  Clear to auscultation bilaterally with normal respiratory effort.   ABDOMEN:  Soft, nontender.  There are no masses.  Bowel sounds are active.   EXTREMITIES:  There is no edema.  The right ankle is casted.   NEUROLOGIC:  She has normal sensation and movement in her right toes.  Strength and sensation are otherwise intact in her left leg and arms.  Cranial nerves II-XII are grossly intact.   PSYCHIATRIC:  Mood and affect appear within normal limits.   SKIN:  No rashes are noted on limited exam.      LABORATORY DATA:  A basic metabolic panel is unremarkable.  White blood cell count is 13.2, hemoglobin 12.9, platelets 362.  Right ankle x-ray showed trimalleolar fracture.  Repeat x-ray after reduction showed improvement in the displacement of the fracture.      ASSESSMENT AND PLAN:  Mrs. Pineda is a 67-year-old woman who suffered a fall resulting in a right ankle trimalleolar fracture.  She has a history of hypertension and dyslipidemia.   1.  Right ankle fracture.  The fracture was reduced and cast in the Emergency Department.  The case was discussed by the ER physician with Orthopedics.  We will request a formal consultation to assess if surgery would be indicated.  She will be treated with pain medications as needed and remain n.p.o. after midnight for possible surgery.  She is medically optimized for surgery.  She has not had any history or symptoms of heart or lung disease.  She has also not had any previous reactions to anesthesia with her previous surgeries.  She does walk daily and has had no anginal symptoms.   2.  Hypertension.  We will reconcile medications and resume.   3.  Dyslipidemia.  Again, reconcile her medications and resume statin.         EDUAR TRIVEDI MD             D: 2018   T: 2018   MT: BEATRICE      Name:     THANG PINEDA   MRN:      [id number]        Account:       [id number]   :      1950           Consult Date:  2018      Document: [id number]

## 2018-02-03 NOTE — PHARMACY-ADMISSION MEDICATION HISTORY
Admission medication history interview status for this patient is complete. See [facility] admission navigator for allergy information, prior to admission medications and immunization status.     Medication history interview source(s):Patient  Medication history resources (including written lists, pill bottles, clinic record):care everywhere  Primary pharmacy:[facility] in [place] [employer]    Changes made to PTA medication list:  Added: all meds  Deleted: -  Changed: -    Actions taken by pharmacist (provider contacted, etc):None     Additional medication history information:None    Medication reconciliation/reorder completed by provider prior to medication history? No    Do you take OTC medications (eg tylenol, ibuprofen, fish oil, eye/ear drops, etc)? yes(Y/N)    For patients on insulin therapy: NO (Y/N)  Lantus/levemir/NPH/Mix 70/30 dose:   (Y/N) (see Med list for doses)   Sliding scale Novolog Y/N  If Yes, do you have a baseline novolog pre-meal dose:  units with meals  Patients eat three meals a day:   Y/N    How many episodes of hypoglycemia do you have per week: _______  How many missed doses do you have per week: ______  How many times do you check your blood glucose per day: _______   Any Barriers to therapy - Be specific :  cost of medications, comfortable with giving injections (if applicable), comfortable and confident with current diabetes regimen: Y/N ______________      Prior to Admission medications    Medication Sig Last Dose Taking? Auth Provider   ASPIRIN EC PO Take 81 mg by mouth daily 2/3/2018 at am Yes Unknown, Entered By History   Cholecalciferol (VITAMIN D-3 PO) Take 2,000 Units by mouth daily 2/3/2018 at am Yes Unknown, Entered By History   LISINOPRIL PO Take 40 mg by mouth daily 2/3/2018 at am Yes Unknown, Entered By History   SIMVASTATIN PO Take 20 mg by mouth daily 2/3/2018 at am Yes Unknown, Entered By History

## 2018-02-04 ENCOUNTER — APPOINTMENT (OUTPATIENT)
Dept: PHYSICAL THERAPY | Facility: CLINIC | Age: 68
End: 2018-02-04
Attending: ORTHOPAEDIC SURGERY
Payer: MEDICARE

## 2018-02-04 VITALS
HEART RATE: 88 BPM | BODY MASS INDEX: 34.39 KG/M2 | TEMPERATURE: 97.9 F | SYSTOLIC BLOOD PRESSURE: 151 MMHG | DIASTOLIC BLOOD PRESSURE: 73 MMHG | OXYGEN SATURATION: 96 % | HEIGHT: 66 IN | WEIGHT: 214 LBS | RESPIRATION RATE: 18 BRPM

## 2018-02-04 PROBLEM — S82.853A TRIMALLEOLAR FRACTURE: Status: ACTIVE | Noted: 2018-02-04

## 2018-02-04 LAB
CREAT SERPL-MCNC: 0.83 MG/DL (ref 0.52–1.04)
GFR SERPL CREATININE-BSD FRML MDRD: 68 ML/MIN/1.7M2
GLUCOSE SERPL-MCNC: 146 MG/DL (ref 70–99)
PLATELET # BLD AUTO: 303 10E9/L (ref 150–450)

## 2018-02-04 PROCEDURE — 36416 COLLJ CAPILLARY BLOOD SPEC: CPT | Performed by: ORTHOPAEDIC SURGERY

## 2018-02-04 PROCEDURE — 25000128 H RX IP 250 OP 636: Performed by: INTERNAL MEDICINE

## 2018-02-04 PROCEDURE — 25000132 ZZH RX MED GY IP 250 OP 250 PS 637: Mod: GY | Performed by: INTERNAL MEDICINE

## 2018-02-04 PROCEDURE — 97116 GAIT TRAINING THERAPY: CPT | Mod: GP

## 2018-02-04 PROCEDURE — 25000128 H RX IP 250 OP 636: Performed by: ORTHOPAEDIC SURGERY

## 2018-02-04 PROCEDURE — 82565 ASSAY OF CREATININE: CPT | Performed by: ORTHOPAEDIC SURGERY

## 2018-02-04 PROCEDURE — 85049 AUTOMATED PLATELET COUNT: CPT | Performed by: ORTHOPAEDIC SURGERY

## 2018-02-04 PROCEDURE — A9270 NON-COVERED ITEM OR SERVICE: HCPCS | Mod: GY | Performed by: ORTHOPAEDIC SURGERY

## 2018-02-04 PROCEDURE — 97530 THERAPEUTIC ACTIVITIES: CPT | Mod: GP

## 2018-02-04 PROCEDURE — A9270 NON-COVERED ITEM OR SERVICE: HCPCS | Mod: GY | Performed by: INTERNAL MEDICINE

## 2018-02-04 PROCEDURE — 97161 PT EVAL LOW COMPLEX 20 MIN: CPT | Mod: GP

## 2018-02-04 PROCEDURE — G0378 HOSPITAL OBSERVATION PER HR: HCPCS

## 2018-02-04 PROCEDURE — 25000132 ZZH RX MED GY IP 250 OP 250 PS 637: Mod: GY | Performed by: ORTHOPAEDIC SURGERY

## 2018-02-04 PROCEDURE — 99217 ZZC OBSERVATION CARE DISCHARGE: CPT | Performed by: INTERNAL MEDICINE

## 2018-02-04 PROCEDURE — 40000894 ZZH STATISTIC OT IP EVAL DEFER: Performed by: OCCUPATIONAL THERAPIST

## 2018-02-04 PROCEDURE — 40000193 ZZH STATISTIC PT WARD VISIT

## 2018-02-04 PROCEDURE — 82947 ASSAY GLUCOSE BLOOD QUANT: CPT | Performed by: ORTHOPAEDIC SURGERY

## 2018-02-04 RX ORDER — SIMVASTATIN 20 MG
20 TABLET ORAL EVERY EVENING
Status: DISCONTINUED | OUTPATIENT
Start: 2018-02-04 | End: 2018-02-04 | Stop reason: HOSPADM

## 2018-02-04 RX ORDER — LISINOPRIL 40 MG/1
40 TABLET ORAL DAILY
Status: DISCONTINUED | OUTPATIENT
Start: 2018-02-04 | End: 2018-02-04 | Stop reason: HOSPADM

## 2018-02-04 RX ORDER — OXYCODONE HYDROCHLORIDE 5 MG/1
5-10 TABLET ORAL EVERY 4 HOURS PRN
Qty: 20 TABLET | Refills: 0 | Status: SHIPPED | OUTPATIENT
Start: 2018-02-04

## 2018-02-04 RX ADMIN — SODIUM CHLORIDE: 9 INJECTION, SOLUTION INTRAVENOUS at 04:22

## 2018-02-04 RX ADMIN — OXYCODONE HYDROCHLORIDE 5 MG: 5 TABLET ORAL at 06:18

## 2018-02-04 RX ADMIN — SODIUM CHLORIDE: 9 INJECTION, SOLUTION INTRAVENOUS at 01:26

## 2018-02-04 RX ADMIN — LISINOPRIL 40 MG: 40 TABLET ORAL at 09:31

## 2018-02-04 RX ADMIN — CEFAZOLIN SODIUM 1 G: 1 INJECTION, SOLUTION INTRAVENOUS at 09:31

## 2018-02-04 RX ADMIN — ACETAMINOPHEN 975 MG: 325 TABLET, FILM COATED ORAL at 07:59

## 2018-02-04 RX ADMIN — SENNOSIDES AND DOCUSATE SODIUM 2 TABLET: 8.6; 5 TABLET ORAL at 01:00

## 2018-02-04 RX ADMIN — CEFAZOLIN SODIUM 1 G: 1 INJECTION, SOLUTION INTRAVENOUS at 02:19

## 2018-02-04 RX ADMIN — Medication 0.5 MG: at 01:00

## 2018-02-04 RX ADMIN — Medication 0.5 MG: at 03:21

## 2018-02-04 RX ADMIN — ACETAMINOPHEN 975 MG: 325 TABLET, FILM COATED ORAL at 00:59

## 2018-02-04 RX ADMIN — SENNOSIDES AND DOCUSATE SODIUM 2 TABLET: 8.6; 5 TABLET ORAL at 07:58

## 2018-02-04 NOTE — PROGRESS NOTES
MANPREET  Asked by  PT to consult with pt about transport home.  Met with pt.  She states they have decided for her to transport home in the family care and they will assist her in the house.  She is aware she is NWB.  Medical transport offered and she declines.

## 2018-02-04 NOTE — OP NOTE
Procedure Date: 02/03/2018      DATE OF PROCEDURE: 02/03/2018      PREOPERATIVE DIAGNOSIS:  Right trimalleolar ankle fracture dislocation.      POSTOPERATIVE DIAGNOSIS:  Right trimalleolar ankle fracture dislocation.      PROCEDURES:   1.  Open reduction internal fixation of the right trimalleolar ankle fracture dislocation.   2.  Interpretation of intraoperative fluoroscopy.   3.  Splinting of the right lower extremity.      ATTENDING SURGEON:  Papa Kennedy MD      ESTIMATED BLOOD LOSS:  5 mL.      TOURNIQUET TIME:  90 minutes.      ANESTHESIA:  General.      IMPLANTS:   1.  7-hole 1/3 tubular Synthes plate with appropriate 3.5 mm cortical screws and 1 lagging 2.7 mm cortical screw.   2.  Two 4.0 cannulated long thread, partially threaded screws for the medial malleolar fixation.      INDICATIONS:  The patient is a 67-year-old female who suffered a fall earlier today.  She was walking outside, slipped and twisted her right ankle.  She immediately had a deformity of her right ankle and was brought to the emergency room where an attempted reduction of the right ankle was performed after x-rays demonstrated a trimalleolar fracture dislocation of her right ankle.  Closed reduction in the emergency department was inadequate and the patient remained subluxed.  She was admitted to the Hospitalist Service for operative fixation.  After discussing with the patient the risks and benefits of the surgery, she agreed to proceed with open reduction and internal fixation.      DESCRIPTION OF PROCEDURE:  On the date of the procedure, the patient was met in the preoperative area by the Surgery and Anesthesia teams.  Her right lower extremity was marked by the operative surgeon and informed consent was obtained.  All of her questions were answered, and risks and benefits of the procedure were discussed with the patient.  She was taken to the operating room, placed on the operating room table in supine position.  General anesthesia  was administered.  A thigh high tourniquet was placed.  The old splint was removed.  The patient was noted to have some early skin blistering over the medial malleolus fracture site; however, no evidence of open injury through the skin was notable.  A bump was placed under the hip.  Preoperative antibiotics were given.  The right lower extremity was prepped and draped in the usual sterile fashion and a preoperative time out was performed.       We began the procedure by reducing the ankle, which was reduced very easily.  We began the surgical procedure by making a standard lateral approach incision to the distal fibula Herzog C fracture.  Sharp dissection was carried through the skin down to the fascia, followed by dissection using scissors in order to avoid the superficial peroneal nerve which was not in our way.  After we got down to the bone, we used a combination of curettes, suction and sharp dissection to clean off the fracture fragments, which were quite fresh.  The patient had a comminuted distal fibula fracture in the Herzog C position.  There was an oblique fragment as well as a posterior butterfly fragment.  The fibula fracture was reduced with pointed reduction clamps. An attempt at anterior-posterior lag screw failed; therefore, we made the decision to place a 7-hole plate centered over the fracture, and we lagged through the plate using a 2.7 cortical screw perpendicularly across the fracture site.  We then placed two 3.5 cortical screws in the 2 most distal holes, followed by two 3.5 cortical screws in the most proximal hole and the third most proximal hole.  The wound was copiously irrigated.        We turned our attention to the medial malleolus. The fracture was a transverse fracture.  The medial malleolus incision was made just posterior to the skin blistering, trying to avoid the skin blisters.  Sharp dissection was carried through the skin.  There was immediate hematoma that was evacuated.   Saphenous structures were avoided.  Sharp dissection was used to clean out the fracture fragments and a pointed reduction clamp was used to reduce the fracture in anatomic position.  This was double checked under fluoroscopy.  Next, the cannulated screws from the 4.0 cannulated screw set were used to hold the fracture in place under fluoroscopic visualization.  These were placed appropriately and measured.  The outer cortex of each screw was drilled individually and a size 46 screw was placed over the posteriormost guidewire and a size 44 screw was placed over the anteriormost guidewire.  These were long thread, partially threaded screws.  These were placed without any difficulty under fluoroscopic guidance.  Clamps were then removed and the K-wires removed.  Final x-rays were taken and a stress test was performed, which noted the syndesmosis to be stable.  Therefore, syndesmotic fixation was deferred.   Evaluation of the posterior malleolus demonstrated a small fracture involving <10% of the joint surface which was in good position after the ankle was reduced and did not need any fixation.     We then turned our attention to closure.  Both sites were closed with 0 Vicryl for fascia, 2-0 Vicryl for deep dermal and 4-0 nylon vertical mattress stitches for the skin.  The sterile dressing was applied, followed by a right lower extremity splint.  The tourniquet was deflated.  The patient was awakened from anesthesia and brought to the PACU in stable condition.         TRACY GEORGE MD             D: 2018   T: 2018   MT:       Name:     THANG PINEDA   MRN:      [id number]        Account:        [id number]   :      1950           Procedure Date: 2018      Document: [id number]

## 2018-02-04 NOTE — PLAN OF CARE
Problem: Patient Care Overview  Goal: Plan of Care/Patient Progress Review  Outcome: No Change  Patient came to floor around 1600.  Voiding spontaneously.  NPO.  Went to surgery around 5:45.  Saline locked.  Declined pain meds.  Prefers not to take narcotics.

## 2018-02-04 NOTE — PLAN OF CARE
Problem: Patient Care Overview  Goal: Plan of Care/Patient Progress Review  OT order received, chart reviewed, and spoke with pt.  Pt. Will have assist at home from  and son's to initially assist with getting pt. Into the house.  Pt. Reports she will be getting a shower bench and has counters near toilets to assist with transfers to maintain NWB R LE.  OT reviewed type of shower and to ensure pt. Able to roll or step into shower without bearing weight.  Pt. Stated it is a large roll in shower and will use her walker.  Pt. Report no needs for IP OT at this time.  OT order completed and pt. Discharging home.

## 2018-02-04 NOTE — DISCHARGE SUMMARY
[facility]    Discharge Summary  Hospitalist    Date of Admission:  2/3/2018  Date of Discharge:  2/4/2018  Provider:  Christiano Carbajal MD  Date of Service (when I last saw the patient): 02/04/18    Discharge Diagnoses   Right trimalleolar fracture  Hypertension   Dyslipidemia    Other medical issues:  Past Medical History:   Diagnosis Date     Obese        History of Present Illness   Disha Alvarez is an 67 year old female with history of hypertension and hyperlipidemia who presented with mechanical fall.  Patient slipped on ice while she was shoveling snow at her house yesterday. Please see the admission history and physical for full details.    Hospital Course   Disha Alvarez was admitted on 2/3/2018.  The following problems were addressed during her hospitalization:    1. Right trimalleolar fracture secondary to mechanical fall. She was seen by orthopedic team and underwent right ankle ORIF on 2/3/18. Post-op course is unremarkable. She uses tylenol for pain as narcotics cause her headache. Seen this morning by orthopedic team, and they are okay for discharging home today she was order to use [place] at [place]  She will follow orthopedic clinic in 2 weeks.  She was recommended for aspirin 300 2525 mg once daily for 6 weeks and then she will resume her aspirin 81 mg daily.  She is receiving Lovenox for DVT prophylaxis while she was in the hospital.  She is currently stable to be discharged today.    2.  Hypertension patient was continued with lisinopril.    3.  Hyperlipidemia patient was continued with soccer.      Significant Results and Procedures   S/p ORIF right ankle 2/3/18    Recent Results (from the past 24 hour(s))   XR Ankle Port Left 2 Views    Narrative    XR ANKLE PORT LT 2 VW 2/3/2018 12:26 PM    COMPARISON: None.    HISTORY: Fracture.      Impression    IMPRESSION: Comminuted and displaced trimalleolar right  "ankle  fracture/dislocation. The fibular fracture is approximately 5 cm  proximal to the tibiotalar joint. The talus is laterally displaced  with respect to the tibia. The posterior malleolar and medial  malleolar fracture fragments maintain their position with respect to  the talus.    OMAR PEDRAZA MD   XR Ankle Right 2 Views    Narrative    XR ANKLE RT 2 VW 2/3/2018 1:32 PM    COMPARISON: Radiographs earlier on the same day.    HISTORY: Postreduction.      Impression    IMPRESSION: Improved alignment of trimalleolar right ankle  fracture/dislocation. The talus remains approximately 1.5 cm laterally  displaced with respect to the tibia.    OMAR PEDRAZA MD   XR Surgery ADONAY L/T 5 Min Fluoro w Stills    Narrative    This exam was marked as non-reportable because it will not be read by a   radiologist or a [place] non-radiologist provider.                     Pending Results     Unresulted Labs Ordered in the Past 30 Days of this Admission     No orders found for last 61 day(s).          Code Status   Full Code       Primary Care Physician   Physician No Ref-Primary    Vitals: /73 (BP Location: Left arm)  Pulse 88  Temp 97.9  F (36.6  C) (Oral)  Resp 18  Ht 1.676 m (5' 6\")  Wt 97.1 kg (214 lb)  SpO2 96%  BMI 34.54 kg/m2  BMI= Body mass index is 34.54 kg/(m^2).    GENERAL:  No acute distress.  SKIN:  Dry and warm.  There is no rash, lesions, ulcers or juandice at area of skin examined.  HEENT:  Head without trauma.  Pupils round, reactive. Exam of conjunctiva and lids are normal. Sclera without icterus. There is no oral thrush.  NECK:  Supple.  There is no cervical adenopathy, no thyromegaly. No jugular venous distension.  CHEST: No reproducible chest tenderness.   LUNGS:  Normal respiratory effort. Lungs are Clear on ascultation.  HEART:  Regular rate and rhythm.  No murmur, gallops or rubs auscultated.  ABDOMEN:  Soft, bowel sounds positive.  There is no tenderness or guarding.   EXTREMITIES: No " edema. Right leg with splint/cast in place.  NEUROLOGIC:  Alert and oriented x3.  There is no focal deficit appreciated.      Discharge Disposition   Discharged to home    Consultations This Hospital Stay   ORTHOPEDIC SURGERY IP CONSULT  OCCUPATIONAL THERAPY ADULT IP CONSULT  PHYSICAL THERAPY ADULT IP CONSULT    Time Spent on this Encounter   Christiano WILLIAMSON, personally saw the patient today and spent less than or equal to 30 minutes discharging this patient. 27 minutes    Discharge Orders     Activity   Your activity upon discharge: activity as instructed with crutches     Follow-up and recommended labs and tests    Follow up with primary care provider, Physician No Ref-Primary, within 7 days to evaluate after surgery.  The following labs/tests are recommended: cbc, vmp.    Follow up with orthopedic, as scheduled in 2 weeks. to evaluate after surgery.  No follow up labs or test are needed.     Full Code     Diet   Follow this diet upon discharge: Orders Placed This Encounter     Advance Diet as Tolerated: Regular Diet Adult       Discharge Medications   Current Discharge Medication List      START taking these medications    Details   order for DME Equipment being ordered: Walker Wheels () and Walker ()  Treatment Diagnosis: Impaired gait  Qty: 1 each, Refills: 0    Associated Diagnoses: Closed trimalleolar fracture of right ankle, initial encounter         CONTINUE these medications which have CHANGED    Details   aspirin  MG EC tablet Take 1 tablet (325 mg) by mouth daily  Qty: 60 tablet, Refills: 0    Comments: For 6 weeks, then resume ASA 81 mg  Associated Diagnoses: Deep vein thrombosis (DVT) prophylaxis prescribed at discharge         CONTINUE these medications which have NOT CHANGED    Details   Cholecalciferol (VITAMIN D-3 PO) Take 2,000 Units by mouth daily      LISINOPRIL PO Take 40 mg by mouth daily      SIMVASTATIN PO Take 20 mg by mouth daily           Allergies   No Known  Allergies  Data       Discharge Procedure Orders  Activity   Order Comments: Your activity upon discharge: activity as instructed with crutches   Order Specific Question Answer Comments   Is discharge order? Yes      Follow-up and recommended labs and tests    Order Comments: Follow up with primary care provider, Physician No Ref-Primary, within 7 days to evaluate after surgery.  The following labs/tests are recommended: cbc, vmp.    Follow up with orthopedic, as scheduled in 2 weeks. to evaluate after surgery.  No follow up labs or test are needed.     Full Code     Diet   Order Comments: Follow this diet upon discharge: Orders Placed This Encounter     Advance Diet as Tolerated: Regular Diet Adult   Order Specific Question Answer Comments   Is discharge order? Yes        Most Recent 3 CBC's:  Recent Labs   Lab Test  02/04/18   0632  02/03/18   1317   WBC   --   13.2*   HGB   --   12.9   MCV   --   95   PLT  303  362      Most Recent 3 BMP's:  Recent Labs   Lab Test  02/04/18   0632  02/03/18   1317   NA   --   137   POTASSIUM   --   4.4   CHLORIDE   --   105   CO2   --   24   BUN   --   18   CR  0.83  0.96   ANIONGAP   --   8   BASILIO   --   9.4   GLC  146*  97     Most Recent 2 LFT's:No lab results found.  Most Recent INR's and Anticoagulation Dosing History:  Anticoagulation Dose History     There is no flowsheet data to display.        Most Recent 3 Troponin's:No lab results found.  Most Recent Cholesterol Panel:No lab results found.  Most Recent 6 Bacteria Isolates From Any Culture (See EPIC Reports for Culture Details):No lab results found.  Most Recent TSH, T4 and A1c Labs:No lab results found.

## 2018-02-04 NOTE — CONSULTS
[facility] Orthopedic Consultation    Disha Alvarez MRN# [id number]   Age: 67 year old YOB: 1950     Date of Admission:  2/3/2018    Reason for consult: R trimalleolar ankle fracture dislocation       Requesting physician: Dr. Harley       Level of consult: One-time consult to assist in determining a diagnosis, recommend an appropriate treatment plan and place orders           Assessment and Plan:   Assessment:   - R trimalleolar fracture dislocation      Plan:   To OR for ORIF of R trimalleolar ankle fracture dislocation  - postop plan is to be NWB RLE  - PT/OT  - pain control  - DVT prophylaxis with aspirin 325 daily for 6 weeks  - f/u with me in 2 weeks for suture removal. Can call my care coordinator Vicky at [phone]               Chief Complaint:   R ankle fracture dislocation         History of Present Illness:   This patient is a 67 year old female who presents with the following condition requiring a hospital admission:    66 yo F who slipped and twisted her ankle earlier today. She had immediate deformity and pain in her right ankle. She was unable to weight bear. Was brought to the ED where she was diagnosed with a right trimalleolar ankle fracture dislocation. An attempted reduction was performed however the postreduction xrays still demonstrated a subluxed ankle. No skin lesions were reported. Sensation was intact and no numbness/tingling was present. No other injuries.          Past Medical History:     Past Medical History:   Diagnosis Date     Obese              Past Surgical History:   History reviewed. No pertinent surgical history.          Social History:     Social History   Substance Use Topics     Smoking status: Never Smoker     Smokeless tobacco: Never Used     Alcohol use Not on file             Family History:   History reviewed. No pertinent family history.          Immunizations:     VACCINE/DOSE   Diptheria   DPT   DTAP   HBIG   Hepatitis A   Hepatitis B    HIB   Influenza   Measles   Meningococcal   MMR   Mumps   Pneumococcal   Polio   Rubella   Small Pox   TDAP   Varicella   Zoster             Allergies:   No Known Allergies          Medications:     Current Facility-Administered Medications   Medication     [Auto Hold] HYDROmorphone (PF) (DILAUDID) injection 0.5 mg     [Auto Hold] propofol (DIPRIVAN) injection 10 mg/mL vial     [Auto Hold] naloxone (NARCAN) injection 0.1-0.4 mg     [Auto Hold] melatonin tablet 1 mg     [Auto Hold] acetaminophen (TYLENOL) tablet 650 mg     [Auto Hold] oxyCODONE IR (ROXICODONE) tablet 5-10 mg     [Auto Hold] HYDROmorphone (PF) (DILAUDID) injection 0.3-0.5 mg     [Auto Hold] magnesium hydroxide (MILK OF MAGNESIA) suspension 30 mL     [Auto Hold] ondansetron (ZOFRAN-ODT) ODT tab 4 mg    Or     [Auto Hold] ondansetron (ZOFRAN) injection 4 mg     [START ON 2/4/2018] 0.9% sodium chloride infusion     lactated ringers infusion     fentaNYL (PF) (SUBLIMAZE) injection 25-50 mcg     fentaNYL (PF) (SUBLIMAZE) injection 25-50 mcg     HYDROmorphone (PF) (DILAUDID) injection 0.3-0.5 mg     ORAL Pain Medications -  may administer as ordered by surgeon for take home use     albuterol neb solution 2.5 mg     ondansetron (ZOFRAN-ODT) ODT tab 4 mg    Or     ondansetron (ZOFRAN) injection 4 mg     prochlorperazine (COMPAZINE) injection 5 mg     promethazine (PHENERGAN) IV injection 6.25 mg     meperidine (DEMEROL) injection 12.5 mg     naloxone (NARCAN) injection 0.1-0.4 mg     metoprolol (LOPRESSOR) injection 1-2 mg     sodium chloride 0.9% (bottle) irrigation             Review of Systems:   All systems were reviewed and were negative except as per hpi          Physical Exam:   All vitals have been reviewed  Patient Vitals for the past 24 hrs:   BP Temp Temp src Pulse Heart Rate Resp SpO2   02/03/18 2100 115/53 - - - 77 10 98 %   02/03/18 1800 137/80 98.8  F (37.1  C) Temporal - - 16 99 %   02/03/18 1538 152/68 98.4  F (36.9  C) Oral 88 - - 97 %    02/03/18 1440 130/68 - - 79 - - 96 %   02/03/18 1315 138/71 - - 82 - - 98 %   02/03/18 1300 130/66 - - - - - 99 %   02/03/18 1145 165/82 - - - - - 97 %   02/03/18 1132 159/72 97.8  F (36.6  C) Oral 84 - 20 98 %       Intake/Output Summary (Last 24 hours) at 02/03/18 2120  Last data filed at 02/03/18 2051   Gross per 24 hour   Intake             1000 ml   Output                5 ml   Net              995 ml     NAD  Normal breathing  No significant swelling  No peripheral edema  Skin intact  RLE:  Splint in place  Foot wwp  +EHL  Normal sensation over distal toes          Data:   All laboratory data reviewed  Results for orders placed or performed during the hospital encounter of 02/03/18   XR Ankle Port Left 2 Views    Narrative    XR ANKLE PORT LT 2 VW 2/3/2018 12:26 PM    COMPARISON: None.    HISTORY: Fracture.      Impression    IMPRESSION: Comminuted and displaced trimalleolar right ankle  fracture/dislocation. The fibular fracture is approximately 5 cm  proximal to the tibiotalar joint. The talus is laterally displaced  with respect to the tibia. The posterior malleolar and medial  malleolar fracture fragments maintain their position with respect to  the talus.    OMAR PEDRAZA MD   XR Ankle Right 2 Views    Narrative    XR ANKLE RT 2 VW 2/3/2018 1:32 PM    COMPARISON: Radiographs earlier on the same day.    HISTORY: Postreduction.      Impression    IMPRESSION: Improved alignment of trimalleolar right ankle  fracture/dislocation. The talus remains approximately 1.5 cm laterally  displaced with respect to the tibia.    OMAR PEDRAZA MD   XR Surgery ADONAY L/T 5 Min Fluoro w Stills    Narrative    This exam was marked as non-reportable because it will not be read by a   radiologist or a [place] non-radiologist provider.             CBC with platelets differential   Result Value Ref Range    WBC 13.2 (H) 4.0 - 11.0 10e9/L    RBC Count 4.20 3.8 - 5.2 10e12/L    Hemoglobin 12.9 11.7 - 15.7 g/dL    Hematocrit 40.0  35.0 - 47.0 %    MCV 95 78 - 100 fl    MCH 30.7 26.5 - 33.0 pg    MCHC 32.3 31.5 - 36.5 g/dL    RDW 12.9 10.0 - 15.0 %    Platelet Count 362 150 - 450 10e9/L    Diff Method Automated Method     % Neutrophils 74.5 %    % Lymphocytes 17.1 %    % Monocytes 5.7 %    % Eosinophils 1.7 %    % Basophils 0.6 %    % Immature Granulocytes 0.4 %    Nucleated RBCs 0 0 /100    Absolute Neutrophil 9.8 (H) 1.6 - 8.3 10e9/L    Absolute Lymphocytes 2.3 0.8 - 5.3 10e9/L    Absolute Monocytes 0.8 0.0 - 1.3 10e9/L    Absolute Eosinophils 0.2 0.0 - 0.7 10e9/L    Absolute Basophils 0.1 0.0 - 0.2 10e9/L    Abs Immature Granulocytes 0.1 0 - 0.4 10e9/L    Absolute Nucleated RBC 0.0    Basic metabolic panel   Result Value Ref Range    Sodium 137 133 - 144 mmol/L    Potassium 4.4 3.4 - 5.3 mmol/L    Chloride 105 94 - 109 mmol/L    Carbon Dioxide 24 20 - 32 mmol/L    Anion Gap 8 3 - 14 mmol/L    Glucose 97 70 - 99 mg/dL    Urea Nitrogen 18 7 - 30 mg/dL    Creatinine 0.96 0.52 - 1.04 mg/dL    GFR Estimate 58 (L) >60 mL/min/1.7m2    GFR Estimate If Black 70 >60 mL/min/1.7m2    Calcium 9.4 8.5 - 10.1 mg/dL            Papa Kennedy MD

## 2018-02-04 NOTE — PLAN OF CARE
"Problem: Patient Care Overview  Goal: Plan of Care/Patient Progress Review  Outcome: Improving  /45 (BP Location: Left arm)  Pulse 88  Temp 97.9  F (36.6  C) (Oral)  Resp 16  Ht 1.676 m (5' 6\")  Wt 97.1 kg (214 lb)  SpO2 94%  BMI 34.54 kg/m2   Pt arrived to the floor via cart from [facility] on PM shift at 2200.. O2 2L via nc on with capnography. Pt awake and alert. Splint & Ace-cdi. Able to wiggle toes. Pian rated between 4-7 out of 10 during the shift. Gave scheduled Tylenol for headache and prn Dilaudid x2 at 0100 and 0315 with effective results. Tolerating well, no N/V. Will move to prn oxy on the next dose; wanted to wait since she had Tylenol at 0100. Repositioned with Rle elevated on multiple pillows w/ ice. VS moved to q 4hours, WDL. Bed alarm activated. Pt oriented to bed/call light. Pt up to the commode x1 with assist of 2, walker, gait belt, with non weight bearing on affected leg. Water provided, pt did not want any other liquid or food. Resting quietly. Moving her to regular diet this am. Will continue to monitor.      "

## 2018-02-04 NOTE — PLAN OF CARE
"Problem: Patient Care Overview  Goal: Plan of Care/Patient Progress Review  Outcome: No Change  Pt arrived to the floor via cart from [facility]. O2 2L via nc on with capnography. Pt awake and alert. Splint & Ace-cdi. Able to wiggle toes. Denies pain-states \"feels heavy with moving.\" Repositioned with Rle elevated on multiple pillows. Continuous vs started. Bed alarm activated. Pt oriented to bed/call light. Drink provided. Resting quietly.       "

## 2018-02-04 NOTE — DISCHARGE INSTRUCTIONS
OK to take tylenol over the counter, and use oxycodone only for breakthrough pain as needed.        No weight bearing on your surgical foot until told otherwise by your surgeon.    Care of your cast:  This information was prepared for you to help you take of your cast. Please read each section carefully and ask your physician if you have any questions.    What to Check For:  1. Check your toes for color changes, swelling, loss of motion, numbness, tingling or severe pain. In infants or young children, check for crying which cannot be soothed by usual methods. Call your physician if these symptoms occur.  2. If swelling is noted during the first 24 to 48 hours, elevate the extremity higher than your  head. After this time, elevate it whenever you are in bed.  3. Check cast for undesirable odors or drainage. Also check for any areas of local pain under your cast. These may be signs of an area of pressure under the cast.  4. Be sure any padding used is well anchored to the cast. Be careful not to pull padding out. Without the padding, loose material may slip into cast and cause irritation.    How to Care for Your Cast:  1. Avoid bumping or knocking the cast against any hard object. Cover rough areas with tape.  2. Never trim or cut down the length of your cast. Please call your physician if the cast becomes loose, broken or cracked.  3. If skin under the cast begins to itch, do not use anything to scratch under the cast since it may break the skin and cause an infection. Parents should be alert to prevent children from sticking forks, sticks or other objects inside the cast.  4. You may wash areas around the cast, but do not saturate the cast in the process.   5. Please keep cast clean and dry. Cover with plastic bag for showers.       While on narcotic pain medication, to avoid constipation:  1. Drink plenty of water to keep well hydrated  2. May take over the counter Colace or Senna (use as directed on label)    Call  your physician (   ) if you experience/notice any of the followin. Fever greater than 100 degrees with body chills or excessive sweating.  2. Bleeding or large amounts of drainage at incision site or on the dressing/cast.   3. A bluish color, increased swelling, loss of motion, increased numbness/tingling or severe pain of toes.  2. Unusually foul odor from toes.  3. Unusual drainage (blood is expected if there has been surgery).  4. Broken, cracked or very loose cast.  5. Localized pain under cast not controlled with oral pain medications, ice and rest.   6. No bowel movement in 3 days (may use Milk of Magnesia or other over the counter remedy).  7. Chest pain, shortness of breath, and/or calf pain with excessive swelling.  8. Generalized feeling of illness.  9. Any other questions or concerns related to your surgery/recovery.      Thank you for allowing [facility] to participate in your cares!!

## 2018-02-04 NOTE — UTILIZATION REVIEW
"  Admission Status; Secondary Review Determination         Under the authority of the Utilization Management Committee, the utilization review process indicated a secondary review on the above patient.  The review outcome is based on review of the medical records, discussions with staff, and applying clinical experience noted on the date of the review.          (x) Observation Status Appropriate - This patient does not meet hospital inpatient criteria and is placed in observation status. If this patient's primary payer is Medicare and was admitted as an inpatient, Condition Code 44 should be used and patient status changed to \"observation\".     RATIONALE FOR DETERMINATION   67-year-old woman who suffered a fall resulting in a right ankle trimalleolar fracture.  She underwent open reduction and internal fixation, without complication, she is cleared for discharge with crutches this morning by the orthopedist. The severity of illness, intensity of service provided, expected LOS and risk for adverse outcome make the care appropriate for further observation; however, doesn't meet criteria for hospital inpatient admission. Dr. Carbajal  notified of this determination.    This document was produced using voice recognition software.      The information on this document is developed by the utilization review team in order for the business office to ensure compliance.  This only denotes the appropriateness of proper admission status and does not reflect the quality of care rendered.         The definitions of Inpatient Status and Observation Status used in making the determination above are those provided in the CMS Coverage Manual, Chapter 1 and Chapter 6, section 70.4.      Sincerely,     SADAF RAMIREZ MD    System Medical Director  Utilization Management  [facility].      "

## 2018-02-04 NOTE — PLAN OF CARE
Problem: Patient Care Overview  Goal: Plan of Care/Patient Progress Review  PT: Orders received, evaluation completed and treatment initiated. 67 year old female s/p ORIF R ankle. NWB. Patient reports IND with mobility/ADLs at baseline[facility] (one rail) to enter. All needs met on one level. Pt reports , sister and daughter can all provide assist at home as needed during recovery.     Discharge Planner PT   Patient plan for discharge: Home with assist from family  Current status: Supine to/from sit with SBA. Sit to/from stand with FWW and CGA. Ambulates 3' and 15' x 2 with FWW and CGA. Trialed crutches, pt has mod LOB balance onto the bed. Discussed safe equipment options and recommendations to enter the home.   Barriers to return to prior living situation: Stairs   Recommendations for discharge: Home with use of FWW with all mobility. Pt will also need Ax2 via wheelchair assist or medical transportation to enter the home. (HO provided to the pt). Pt would benefit from HHPT, but she is declining at this time.  Rationale for recommendations: Patient currently needing a walker for safe mobility. Will not be safe to perform stairs on her own to enter the home. Pt currently deciding if she would like to use family assist and a wheelchair to enter the home or medical transport.        Entered by: Marianela Starks 02/04/2018 10:39 AM         Physical Therapy Discharge Summary    Reason for therapy discharge:    Discharged to home.    Progress towards therapy goal(s). See goals on Care Plan in [facility] electronic health record for goal details.  Goals not met.  Barriers to achieving goals:   discharge from facility.    Therapy recommendation(s):    Continued therapy is recommended.  Rationale/Recommendations:  HHPT was recommended.

## 2018-02-04 NOTE — PLAN OF CARE
Problem: Patient Care Overview  Goal: Plan of Care/Patient Progress Review  Outcome: Adequate for Discharge Date Met: 02/04/18  A/O. Up A1 to commode. NWB w/walker. IV removed. AVS reviewed with pt and spouse, reviewed aspirin for anticoag, oxy, follow up, cast care, and when to seek medical attention. Filled oxy signed for and sent home with pt. No further questions at this time. Discharged home with .

## 2018-02-04 NOTE — BRIEF OP NOTE
[facility] Brief Operative Note    Pre-operative diagnosis: Trimalleolar Ankle fx - right   Post-operative diagnosis Trimalleolar Ankle fx - right   Procedure: Procedure(s):  OPEN REDUCTION INTERNAL FIXATION RIGHT ANKLE - Wound Class: I-Clean   Surgeon(s): Surgeon(s) and Role:     * Papa Kennedy MD - Primary   Estimated blood loss: 5 mL    Specimens: * No specimens in log *   Findings: See operative dictation    Plan:  - NWB RLE  - PT/OT  - pain control  - periop abx  - advance diet as tolerated  - Aspirin for DVT prophylaxis x4 weeks - 325mg daily  - f/u with me in 2 weeks to change to remove stitches and transition to CAM boot. Can call my care coordinator Vicky at [phone] to schedule follow up

## 2018-02-04 NOTE — ANESTHESIA POSTPROCEDURE EVALUATION
Patient: Disha Alvarez    Procedure(s):  OPEN REDUCTION INTERNAL FIXATION RIGHT ANKLE - Wound Class: I-Clean    Diagnosis:ANKLE FRACTURE  Diagnosis Additional Information: Trimalleolar Ankle fx - right    Anesthesia Type:  General    Note:  Anesthesia Post Evaluation    Patient location during evaluation: PACU  Patient participation: Able to fully participate in evaluation  Level of consciousness: awake  Pain management: adequate  Airway patency: patent  Cardiovascular status: acceptable  Respiratory status: acceptable  Hydration status: acceptable  PONV: controlled     Anesthetic complications: None          Last vitals:  Vitals:    02/03/18 2150 02/03/18 2155 02/03/18 2200   BP: 130/66 130/66 133/68   Pulse:      Resp: 14 13 11   Temp:   97.8  F (36.6  C)   SpO2: 97% 97% 97%         Electronically Signed By: Marcos Ortega DO  February 3, 2018  10:26 PM

## 2018-02-04 NOTE — PROGRESS NOTES
"S:  No acute events overnight. Pain well controlled. Doing well.    O:  /73 (BP Location: Left arm)  Pulse 88  Temp 97.9  F (36.6  C) (Oral)  Resp 18  Ht 1.676 m (5' 6\")  Wt 97.1 kg (214 lb)  SpO2 99%  BMI 34.54 kg/m2    RLE:  Splint in place  Wiggles toes  Toes wwp  Normal sensation    A/P:  67 year old female s/p ORIF R ankle on 2/3/18.    - doing well  - nwb rle  - pt/ot  - pain control  - dvt prophylaxis withoral lovenox while in hospital, transition to aspirin 325 upon discharge for 6 weeks  - ok to discharge once safe on crutches from ortho perspective  - f/u with me at [facility] in 2 weeks. Can call my care coordinator Vicky at [phone]                "

## 2018-02-04 NOTE — ANESTHESIA CARE TRANSFER NOTE
Patient: Disha Alvarez    Procedure(s):  OPEN REDUCTION INTERNAL FIXATION RIGHT ANKLE - Wound Class: I-Clean    Diagnosis: ANKLE FRACTURE  Diagnosis Additional Information: No value filed.    Anesthesia Type:   General     Note:  Airway :Face Mask  Patient transferred to:PACU  Handoff Report: Identifed the Patient, Identified the Reponsible Provider, Reviewed the pertinent medical history, Discussed the surgical course, Reviewed Intra-OP anesthesia mangement and issues during anesthesia, Set expectations for post-procedure period and Allowed opportunity for questions and acknowledgement of understanding      Vitals: (Last set prior to Anesthesia Care Transfer)    CRNA VITALS  2/3/2018 2029 - 2/3/2018 2101      2/3/2018             EKG: NSR                Electronically Signed By: Dean Dennis Severson, APRN CRNA  February 3, 2018  9:01 PM

## 2018-02-04 NOTE — PROGRESS NOTES
02/04/18 0900   Quick Adds   Type of Visit Initial PT Evaluation   Living Environment   Lives With spouse   Living Arrangements house   Home Accessibility (Walk in shower)   Number of Stairs to Enter Home 2  (One rail)   Number of Stairs Within Home 0   Transportation Available car;family or friend will provide   Living Environment Comment  is in good health and can assist at home. Daughter and sister can also assist.   Self-Care   Usual Activity Tolerance good   Current Activity Tolerance poor   Equipment Currently Used at Home shower chair   Functional Level Prior   Ambulation 0-->independent   Transferring 0-->independent   Toileting 0-->independent   Bathing 0-->independent   Dressing 0-->independent   Fall history within last six months yes   Number of times patient has fallen within last six months 1   General Information   Onset of Illness/Injury or Date of Surgery - Date 02/03/18   Referring Physician MD Brent   Patient/Family Goals Statement Return home   Pertinent History of Current Problem (include personal factors and/or comorbidities that impact the POC) 67 year old female s/p ORIF R ankle.    Precautions/Limitations fall precautions   Cognitive Status Examination   Orientation orientation to person, place and time   Level of Consciousness alert   Follows Commands and Answers Questions 100% of the time;able to follow single-step instructions   Pain Assessment   Patient Currently in Pain Yes, see Vital Sign flowsheet   Integumentary/Edema   Integumentary/Edema Comments Dressing to R ankle clean, dry and intact   Strength   Strength Comments Decreased activity tolerance. Fatigues quickly during mobility.   Bed Mobility   Bed Mobility Comments Supine to sit with SBA   Transfer Skills   Transfer Comments Sit to/from stand with SBA   Gait   Gait Comments Ambulates 3' from commode to bed with FWW and CGA   Balance   Balance Comments Requires bilateral UE support on FWW for safe mobility at this  "time   Sensory Examination   Sensory Perception Comments Denies burning, numbness and tingling   General Therapy Interventions   Planned Therapy Interventions gait training;transfer training;progressive activity/exercise;strengthening   Clinical Impression   Criteria for Skilled Therapeutic Intervention yes, treatment indicated   PT Diagnosis Impaired gait   Influenced by the following impairments Pain, decreased activity tolerance, impaired balance, LE weakness   Functional limitations due to impairments Decreased IND with gait, transfers, stairs   Clinical Presentation Stable/Uncomplicated   Clinical Presentation Rationale Medically stable   Clinical Decision Making (Complexity) Low complexity   Therapy Frequency` daily   Predicted Duration of Therapy Intervention (days/wks) 3 days   Anticipated Discharge Disposition Home with Assist   Risk & Benefits of therapy have been explained Yes   Patient, Family & other staff in agreement with plan of care Yes   [facility] [employer] TM \"6 Clicks\"   2016, Trustees of [facility], under license to [employer].  All rights reserved.   6 Clicks Short Forms Basic Mobility Inpatient Short Form   [facility] [employer]  \"6 Clicks\" V.2 Basic Mobility Inpatient Short Form   1. Turning from your back to your side while in a flat bed without using bedrails? 4 - None   2. Moving from lying on your back to sitting on the side of a flat bed without using bedrails? 3 - A Little   3. Moving to and from a bed to a chair (including a wheelchair)? 3 - A Little   4. Standing up from a chair using your arms (e.g., wheelchair, or bedside chair)? 3 - A Little   5. To walk in hospital room? 3 - A Little   6. Climbing 3-5 steps with a railing? 1 - Total   Basic Mobility Raw Score (Score out of 24.Lower scores equate to lower levels of function) 17   Total Evaluation Time   Total Evaluation Time (Minutes) 6     "

## 2018-02-04 NOTE — ANESTHESIA PREPROCEDURE EVALUATION
Anesthesia Evaluation     .             ROS/MED HX    ENT/Pulmonary:  - neg pulmonary ROS     Neurologic:  - neg neurologic ROS     Cardiovascular:     (+) Dyslipidemia, hypertension----. : . . . :. .       METS/Exercise Tolerance:     Hematologic:  - neg hematologic  ROS       Musculoskeletal:  - neg musculoskeletal ROS       GI/Hepatic:  - neg GI/hepatic ROS       Renal/Genitourinary:  - ROS Renal section negative       Endo:         Psychiatric:  - neg psychiatric ROS       Infectious Disease:  - neg infectious disease ROS       Malignancy:         Other: Comment: .Lab Test        02/03/18                       1317          WBC          13.2*         HGB          12.9          MCV          95            PLT          362            Lab Test        02/03/18                       1317          NA           137           POTASSIUM    4.4           CHLORIDE     105           CO2          24            BUN          18            CR           0.96          ANIONGAP     8             BASILIO          9.4           GLC          97            \                    Physical Exam  Normal systems: cardiovascular and pulmonary    Airway   Mallampati: II    Dental     Cardiovascular   Rhythm and rate: regular and normal      Pulmonary    breath sounds clear to auscultation                    Anesthesia Plan      History & Physical Review  History and physical reviewed and following examination; no interval change.    ASA Status:  2 .        Plan for General with Intravenous induction. Maintenance will be Inhalation and Balanced.    PONV prophylaxis:  Ondansetron (or other 5HT-3) and Promethazine or metoclopramide       Postoperative Care  Postoperative pain management:  IV analgesics, Oral pain medications and Multi-modal analgesia.      Consents  Anesthetic plan, risks, benefits and alternatives discussed with:  Patient or representative..                          .

## 2024-03-16 ENCOUNTER — APPOINTMENT (OUTPATIENT)
Dept: GENERAL RADIOLOGY | Facility: CLINIC | Age: 74
End: 2024-03-16
Attending: EMERGENCY MEDICINE
Payer: MEDICARE

## 2024-03-16 ENCOUNTER — APPOINTMENT (OUTPATIENT)
Dept: CT IMAGING | Facility: CLINIC | Age: 74
End: 2024-03-16
Attending: EMERGENCY MEDICINE
Payer: MEDICARE

## 2024-03-16 ENCOUNTER — HOSPITAL ENCOUNTER (EMERGENCY)
Facility: CLINIC | Age: 74
Discharge: HOME | End: 2024-03-16
Attending: EMERGENCY MEDICINE | Admitting: EMERGENCY MEDICINE
Payer: MEDICARE

## 2024-03-16 VITALS
WEIGHT: 190 LBS | HEIGHT: 65 IN | HEART RATE: 81 BPM | DIASTOLIC BLOOD PRESSURE: 70 MMHG | SYSTOLIC BLOOD PRESSURE: 152 MMHG | OXYGEN SATURATION: 97 % | TEMPERATURE: 97 F | RESPIRATION RATE: 18 BRPM | BODY MASS INDEX: 31.65 KG/M2

## 2024-03-16 DIAGNOSIS — M79.652 PAIN OF LEFT THIGH: ICD-10-CM

## 2024-03-16 DIAGNOSIS — M25.552 HIP PAIN, LEFT: ICD-10-CM

## 2024-03-16 PROCEDURE — 250N000013 HC RX MED GY IP 250 OP 250 PS 637: Performed by: EMERGENCY MEDICINE

## 2024-03-16 PROCEDURE — 72192 CT PELVIS W/O DYE: CPT | Mod: MA

## 2024-03-16 PROCEDURE — 99284 EMERGENCY DEPT VISIT MOD MDM: CPT | Mod: 25

## 2024-03-16 PROCEDURE — 72170 X-RAY EXAM OF PELVIS: CPT

## 2024-03-16 PROCEDURE — 73552 X-RAY EXAM OF FEMUR 2/>: CPT | Mod: LT

## 2024-03-16 RX ORDER — CYCLOBENZAPRINE HCL 10 MG
10 TABLET ORAL ONCE
Status: COMPLETED | OUTPATIENT
Start: 2024-03-16 | End: 2024-03-16

## 2024-03-16 RX ORDER — CYCLOBENZAPRINE HCL 10 MG
10 TABLET ORAL 3 TIMES DAILY PRN
Qty: 20 TABLET | Refills: 0 | Status: SHIPPED | OUTPATIENT
Start: 2024-03-16 | End: 2024-03-22

## 2024-03-16 RX ORDER — OXYCODONE HYDROCHLORIDE 5 MG/1
5 TABLET ORAL ONCE
Status: COMPLETED | OUTPATIENT
Start: 2024-03-16 | End: 2024-03-16

## 2024-03-16 RX ADMIN — OXYCODONE HYDROCHLORIDE 5 MG: 5 TABLET ORAL at 17:23

## 2024-03-16 RX ADMIN — OXYCODONE HYDROCHLORIDE 5 MG: 5 TABLET ORAL at 19:25

## 2024-03-16 RX ADMIN — CYCLOBENZAPRINE 10 MG: 10 TABLET, FILM COATED ORAL at 21:26

## 2024-03-16 ASSESSMENT — ACTIVITIES OF DAILY LIVING (ADL)
ADLS_ACUITY_SCORE: 36
ADLS_ACUITY_SCORE: 36
ADLS_ACUITY_SCORE: 34
ADLS_ACUITY_SCORE: 36
ADLS_ACUITY_SCORE: 36

## 2024-03-16 ASSESSMENT — COLUMBIA-SUICIDE SEVERITY RATING SCALE - C-SSRS
1. IN THE PAST MONTH, HAVE YOU WISHED YOU WERE DEAD OR WISHED YOU COULD GO TO SLEEP AND NOT WAKE UP?: NO
6. HAVE YOU EVER DONE ANYTHING, STARTED TO DO ANYTHING, OR PREPARED TO DO ANYTHING TO END YOUR LIFE?: NO
2. HAVE YOU ACTUALLY HAD ANY THOUGHTS OF KILLING YOURSELF IN THE PAST MONTH?: NO

## 2024-03-16 NOTE — ED TRIAGE NOTES
Pt states she was picking up a towel after her shower this morning and felt pain down her left hip.  Pt states she has tried antiinflammatories with no relief.  She states she can't lift her leg.  She denies any fall or other trauma.     Triage Assessment (Adult)       Row Name 03/16/24 1627          Triage Assessment    Airway WDL WDL        Cardiac WDL    Cardiac WDL WDL

## 2024-03-16 NOTE — ED PROVIDER NOTES
"  History     Chief Complaint:  Hip Pain       HPI   Disha Alvarez is a 73 year old female with a history of hypertension and high cholesterol who presents to the ED for an evaluation of left hip pain. The patient reports that she bent down to  something, when she got a shooting pain on her left leg. She notes that the pain is radiating from her left leg to her groin. She endorses pain getting worse with laying down. She states that she used pain relief medication and heating pads with no relief. No fall. No history of hip surgery.  No known allergy to medication.     Independent Historian:    Patient, as per HPI.     Review of External Notes:  None    Medications:    Aspirin   Lisinopril   Oxycodone   Simvastatin       Past Medical History:   High cholesterol   Hypertension       Past Surgical History:    Trimalleolar ankle fracture dislocation        Physical Exam   Patient Vitals for the past 24 hrs:   BP Temp Temp src Pulse Resp SpO2 Height Weight   03/16/24 2113 (!) 152/70 -- -- -- -- 97 % -- --   03/16/24 2100 (!) 159/70 -- -- 81 -- 95 % -- --   03/16/24 2015 -- -- -- -- -- 97 % -- --   03/16/24 2000 -- -- -- -- -- 99 % -- --   03/16/24 1953 -- -- -- -- -- 98 % -- --   03/16/24 1952 (!) 177/81 -- -- 85 -- -- -- --   03/16/24 1631 (!) 169/89 97  F (36.1  C) Temporal 97 18 100 % 1.651 m (5' 5\") 86.2 kg (190 lb)        Physical Exam  General: Sitting on the ED wheelchair  HEENT: Normocephalic, atraumatic  Cardiac: Warm and well perfused  Pulm: Breathing comfortably, no accessory muscle usage, no conversational dyspnea  MSK: No bony deformities, no tenderness about the left leg or knee or with active and passive range of motion of the left knee.  No mid thigh tenderness or bony instability on the left.  There is pain in the inguinal crease with active and passive range of motion of the left hip.  No midline lumbar spinal tenderness and no left paraspinal lumbar tenderness.  Skin: Warm and dry  Neuro: Moves " all extremities, sensorimotor intact to the left foot  Psych: Pleasant mood and affect    Emergency Department Course   Imaging:  CT Pelvis Bone wo Contrast   Final Result   IMPRESSION:   1.  No evidence of acute trauma in the pelvis.   2.  Other ancillary findings as described above.         XR Femur Left 2 Views   Final Result   IMPRESSION: No acute fracture or malalignment. There is normal hip joint spacing bilaterally. Moderate to severe degenerative changes of the lower lumbar spine.      XR Pelvis 1/2 Views   Final Result   IMPRESSION: No acute fracture or malalignment. There is normal hip joint spacing bilaterally. Moderate to severe degenerative changes of the lower lumbar spine.          Emergency Department Course & Assessments:    Interventions:  Medications   oxyCODONE (ROXICODONE) tablet 5 mg (5 mg Oral $Given 3/16/24 1723)   oxyCODONE (ROXICODONE) tablet 5 mg (5 mg Oral $Given 3/16/24 1925)   cyclobenzaprine (FLEXERIL) tablet 10 mg (10 mg Oral $Given 3/16/24 2126)        Assessments/Independent Interpretation (X-rays, CTs, rhythm strip):  Left hip x-ray shows no evidence of fracture or dislocation    ED Course as of 24   Sat Mar 16, 2024   171 I have evaluated the patient    I rechecked the patient and she still has pain.     I reevaluated the patient        Consultations/Discussion of Management or Tests:  None    Social Determinants of Health affecting care:  None     Disposition:  The patient was discharged.    Impression & Plan    Medical Decision Makin-year-old female presents with atraumatic left hip pain as above.  Overall presentation and exam is concerning for a bony injury around the left hip such as a fracture, perhaps due to underlying osteopenia/osteoporosis.  Patient is thankfully neurovascularly intact to the left lower extremity distally.  She is given oxycodone for pain, and x-rays showed no bony injury.  Given the amount of her pain and difficulty with  ambulation a CT was obtained of the pelvis to evaluate for occult bony injury.  CT was also thankfully without any acute findings.  Upon reevaluation, the patient's pain is improving and she is now able to ambulate.  Overall presentation could be consistent with a muscle strain or muscle spasm.  Stable for outpatient management.  Plan is for discharge home on a short course of as needed Flexeril, recommendation for Tylenol over-the-counter, ice, PCP follow-up for further care.      Diagnosis:    ICD-10-CM    1. Hip pain, left  M25.552       2. Pain of left thigh  M79.652            Discharge Medications:  Discharge Medication List as of 3/16/2024  9:28 PM        START taking these medications    Details   cyclobenzaprine (FLEXERIL) 10 MG tablet Take 1 tablet (10 mg) by mouth 3 times daily as needed for muscle spasms, Disp-20 tablet, R-0, E-Prescribe                Scribe Disclosure:  I, Daphney Ward, am serving as a scribe at 5:28 PM on 3/16/2024 to document services personally performed by Avery Soto MD based on my observations and the provider's statements to me.  3/16/2024   Avery Soto MD King, Colin, MD  03/16/24 0855

## (undated) DEVICE — BAG CLEAR TRASH 1.3M 39X33" P4040C

## (undated) DEVICE — GUIDE WIRE 1.25X150MM NON THRD  900.721

## (undated) DEVICE — LINEN HALF SHEET 5512

## (undated) DEVICE — GLOVE PROTEXIS POWDER FREE 8.5 ORTHOPEDIC 2D73ET85

## (undated) DEVICE — IMP SCR SYN CORTEX 2.7X20MM SELF TAP SS 202.820
Type: IMPLANTABLE DEVICE | Site: ANKLE | Status: NON-FUNCTIONAL
Removed: 2018-02-03

## (undated) DEVICE — TOURNIQUET CUFF 30" REPRO BLUE 60-7070-105

## (undated) DEVICE — SU VICRYL 2-0 PS-2 27" UNDYED J428H

## (undated) DEVICE — SU ETHILON 3-0 PS-2 18" 1669H

## (undated) DEVICE — DRAPE IOBAN INCISE 23X17" 6650EZ

## (undated) DEVICE — DRILL BIT QUICK COUPLING 2.0X125MM  310.21

## (undated) DEVICE — CAST PADDING 4" UNSTERILE 9044

## (undated) DEVICE — DRAPE C-ARMOR 5 SIDED 5523

## (undated) DEVICE — DRAPE X-RAY TUBE 00-901169-01-OEC

## (undated) DEVICE — GLOVE PROTEXIS W/NEU-THERA 8.5  2D73TE85

## (undated) DEVICE — DRILL BIT SYN QUICK COUPLING 2.7X125MM 315.28

## (undated) DEVICE — BNDG ELASTIC 4" DBL LENGTH UNSTERILE 6611-14

## (undated) DEVICE — CAST PLASTER SPLINT XTRA FAST 5X30" 7392

## (undated) DEVICE — DRSG ABDOMINAL 07 1/2X8" 7197D

## (undated) DEVICE — PREP CHLORAPREP 26ML TINTED ORANGE  260815

## (undated) DEVICE — DRILL BIT QUICK COUPLING 2.5X110MM GOLD 310.25

## (undated) DEVICE — LINEN FULL SHEET 5511

## (undated) DEVICE — SUCTION CANISTER MEDIVAC LINER 3000ML W/LID 65651-530

## (undated) DEVICE — LINEN ORTHO ACL PACK 5447

## (undated) DEVICE — ESU GROUND PAD ADULT W/CORD E7507

## (undated) DEVICE — DRILL BIT QUICK COUPLING CANN 2.7MM 310.67

## (undated) DEVICE — CAST PADDING 4" STERILE 9044S

## (undated) DEVICE — PACK LOWER EXTREMITY RIDGES

## (undated) DEVICE — SU VICRYL 0 CT-1 27" J340H

## (undated) RX ORDER — PROPOFOL 10 MG/ML
INJECTION, EMULSION INTRAVENOUS
Status: DISPENSED
Start: 2018-02-03

## (undated) RX ORDER — GLYCOPYRROLATE 0.2 MG/ML
INJECTION INTRAMUSCULAR; INTRAVENOUS
Status: DISPENSED
Start: 2018-02-03

## (undated) RX ORDER — FENTANYL CITRATE 50 UG/ML
INJECTION, SOLUTION INTRAMUSCULAR; INTRAVENOUS
Status: DISPENSED
Start: 2018-02-03

## (undated) RX ORDER — LIDOCAINE HYDROCHLORIDE 10 MG/ML
INJECTION, SOLUTION EPIDURAL; INFILTRATION; INTRACAUDAL; PERINEURAL
Status: DISPENSED
Start: 2018-02-03

## (undated) RX ORDER — VASOPRESSIN 20 U/ML
INJECTION PARENTERAL
Status: DISPENSED
Start: 2018-02-03

## (undated) RX ORDER — CEFAZOLIN SODIUM 2 G/100ML
INJECTION, SOLUTION INTRAVENOUS
Status: DISPENSED
Start: 2018-02-03

## (undated) RX ORDER — DEXAMETHASONE SODIUM PHOSPHATE 4 MG/ML
INJECTION, SOLUTION INTRA-ARTICULAR; INTRALESIONAL; INTRAMUSCULAR; INTRAVENOUS; SOFT TISSUE
Status: DISPENSED
Start: 2018-02-03

## (undated) RX ORDER — ONDANSETRON 2 MG/ML
INJECTION INTRAMUSCULAR; INTRAVENOUS
Status: DISPENSED
Start: 2018-02-03